# Patient Record
Sex: MALE | Race: WHITE | NOT HISPANIC OR LATINO | Employment: OTHER | URBAN - METROPOLITAN AREA
[De-identification: names, ages, dates, MRNs, and addresses within clinical notes are randomized per-mention and may not be internally consistent; named-entity substitution may affect disease eponyms.]

---

## 2023-02-27 LAB
ANION GAP IN SER/PLAS: 15 MMOL/L (ref 10–20)
CALCIUM (MG/DL) IN SER/PLAS: 10.7 MG/DL (ref 8.6–10.6)
CARBON DIOXIDE, TOTAL (MMOL/L) IN SER/PLAS: 27 MMOL/L (ref 21–32)
CHLORIDE (MMOL/L) IN SER/PLAS: 101 MMOL/L (ref 98–107)
CREATININE (MG/DL) IN SER/PLAS: 1.03 MG/DL (ref 0.5–1.3)
ESTIMATED AVERAGE GLUCOSE FOR HBA1C: 171 MG/DL
GFR MALE: 77 ML/MIN/1.73M2
GLUCOSE (MG/DL) IN SER/PLAS: 125 MG/DL (ref 74–99)
HEMOGLOBIN A1C/HEMOGLOBIN TOTAL IN BLOOD: 7.6 %
POTASSIUM (MMOL/L) IN SER/PLAS: 4.7 MMOL/L (ref 3.5–5.3)
SODIUM (MMOL/L) IN SER/PLAS: 138 MMOL/L (ref 136–145)
UREA NITROGEN (MG/DL) IN SER/PLAS: 27 MG/DL (ref 6–23)

## 2023-04-20 ENCOUNTER — HOSPITAL ENCOUNTER (OUTPATIENT)
Dept: DATA CONVERSION | Facility: HOSPITAL | Age: 73
End: 2023-04-20
Attending: INTERNAL MEDICINE | Admitting: INTERNAL MEDICINE
Payer: COMMERCIAL

## 2023-04-20 DIAGNOSIS — E11.9 TYPE 2 DIABETES MELLITUS WITHOUT COMPLICATIONS (MULTI): ICD-10-CM

## 2023-04-20 DIAGNOSIS — K57.30 DIVERTICULOSIS OF LARGE INTESTINE WITHOUT PERFORATION OR ABSCESS WITHOUT BLEEDING: ICD-10-CM

## 2023-04-20 DIAGNOSIS — Z86.010 PERSONAL HISTORY OF COLONIC POLYPS: ICD-10-CM

## 2023-04-20 DIAGNOSIS — E78.5 HYPERLIPIDEMIA, UNSPECIFIED: ICD-10-CM

## 2023-04-20 DIAGNOSIS — M15.9 POLYOSTEOARTHRITIS, UNSPECIFIED: ICD-10-CM

## 2023-04-20 DIAGNOSIS — I10 ESSENTIAL (PRIMARY) HYPERTENSION: ICD-10-CM

## 2023-04-20 DIAGNOSIS — Z87.891 PERSONAL HISTORY OF NICOTINE DEPENDENCE: ICD-10-CM

## 2023-04-20 DIAGNOSIS — Z79.02 LONG TERM (CURRENT) USE OF ANTITHROMBOTICS/ANTIPLATELETS: ICD-10-CM

## 2023-04-20 DIAGNOSIS — E66.9 OBESITY, UNSPECIFIED: ICD-10-CM

## 2023-04-20 DIAGNOSIS — Z12.11 ENCOUNTER FOR SCREENING FOR MALIGNANT NEOPLASM OF COLON: ICD-10-CM

## 2023-04-20 LAB — POCT GLUCOSE: 156 MG/DL (ref 74–99)

## 2023-06-28 LAB
ANION GAP IN SER/PLAS: 13 MMOL/L (ref 10–20)
CALCIUM (MG/DL) IN SER/PLAS: 10.8 MG/DL (ref 8.6–10.6)
CARBON DIOXIDE, TOTAL (MMOL/L) IN SER/PLAS: 27 MMOL/L (ref 21–32)
CHLORIDE (MMOL/L) IN SER/PLAS: 104 MMOL/L (ref 98–107)
CREATININE (MG/DL) IN SER/PLAS: 0.95 MG/DL (ref 0.5–1.3)
ESTIMATED AVERAGE GLUCOSE FOR HBA1C: 200 MG/DL
GFR MALE: 85 ML/MIN/1.73M2
GLUCOSE (MG/DL) IN SER/PLAS: 151 MG/DL (ref 74–99)
HEMOGLOBIN A1C/HEMOGLOBIN TOTAL IN BLOOD: 8.6 %
POTASSIUM (MMOL/L) IN SER/PLAS: 4.5 MMOL/L (ref 3.5–5.3)
SODIUM (MMOL/L) IN SER/PLAS: 139 MMOL/L (ref 136–145)
UREA NITROGEN (MG/DL) IN SER/PLAS: 22 MG/DL (ref 6–23)

## 2023-09-28 LAB
ANION GAP IN SER/PLAS: 18 MMOL/L (ref 10–20)
CALCIUM (MG/DL) IN SER/PLAS: 10.8 MG/DL (ref 8.6–10.6)
CARBON DIOXIDE, TOTAL (MMOL/L) IN SER/PLAS: 23 MMOL/L (ref 21–32)
CHLORIDE (MMOL/L) IN SER/PLAS: 103 MMOL/L (ref 98–107)
CREATININE (MG/DL) IN SER/PLAS: 1.06 MG/DL (ref 0.5–1.3)
ESTIMATED AVERAGE GLUCOSE FOR HBA1C: 260 MG/DL
GFR MALE: 74 ML/MIN/1.73M2
GLUCOSE (MG/DL) IN SER/PLAS: 192 MG/DL (ref 74–99)
HEMOGLOBIN A1C/HEMOGLOBIN TOTAL IN BLOOD: 10.7 %
POTASSIUM (MMOL/L) IN SER/PLAS: 4.9 MMOL/L (ref 3.5–5.3)
SODIUM (MMOL/L) IN SER/PLAS: 139 MMOL/L (ref 136–145)
UREA NITROGEN (MG/DL) IN SER/PLAS: 22 MG/DL (ref 6–23)

## 2023-10-26 DIAGNOSIS — I10 PRIMARY HYPERTENSION: Primary | ICD-10-CM

## 2023-10-27 RX ORDER — BENAZEPRIL HYDROCHLORIDE 20 MG/1
20 TABLET ORAL DAILY
Qty: 90 TABLET | Refills: 3 | Status: SHIPPED | OUTPATIENT
Start: 2023-10-27 | End: 2023-11-27 | Stop reason: SDUPTHER

## 2023-11-06 DIAGNOSIS — I10 PRIMARY HYPERTENSION: Primary | ICD-10-CM

## 2023-11-07 RX ORDER — METOPROLOL SUCCINATE 50 MG/1
50 TABLET, EXTENDED RELEASE ORAL DAILY
Qty: 90 TABLET | Refills: 3 | Status: SHIPPED | OUTPATIENT
Start: 2023-11-07 | End: 2023-11-27 | Stop reason: SDUPTHER

## 2023-11-08 DIAGNOSIS — I10 ESSENTIAL (PRIMARY) HYPERTENSION: ICD-10-CM

## 2023-11-09 RX ORDER — AMLODIPINE BESYLATE 10 MG/1
10 TABLET ORAL DAILY
Qty: 90 TABLET | Refills: 3 | Status: SHIPPED | OUTPATIENT
Start: 2023-11-09 | End: 2023-11-27 | Stop reason: SDUPTHER

## 2023-11-27 DIAGNOSIS — I10 ESSENTIAL (PRIMARY) HYPERTENSION: ICD-10-CM

## 2023-11-27 DIAGNOSIS — E11.69 TYPE 2 DIABETES MELLITUS WITH OTHER SPECIFIED COMPLICATION, WITH LONG-TERM CURRENT USE OF INSULIN (MULTI): ICD-10-CM

## 2023-11-27 DIAGNOSIS — E11.69 HYPERLIPIDEMIA ASSOCIATED WITH TYPE 2 DIABETES MELLITUS (MULTI): ICD-10-CM

## 2023-11-27 DIAGNOSIS — E78.5 HYPERLIPIDEMIA ASSOCIATED WITH TYPE 2 DIABETES MELLITUS (MULTI): ICD-10-CM

## 2023-11-27 DIAGNOSIS — Z79.4 TYPE 2 DIABETES MELLITUS WITH OTHER SPECIFIED COMPLICATION, WITH LONG-TERM CURRENT USE OF INSULIN (MULTI): ICD-10-CM

## 2023-11-27 DIAGNOSIS — I10 PRIMARY HYPERTENSION: ICD-10-CM

## 2023-11-27 RX ORDER — CLOPIDOGREL BISULFATE 75 MG/1
75 TABLET ORAL DAILY
COMMUNITY
End: 2023-11-27 | Stop reason: SDUPTHER

## 2023-11-27 RX ORDER — GLIPIZIDE 10 MG/1
10 TABLET, FILM COATED, EXTENDED RELEASE ORAL
COMMUNITY
Start: 2023-09-27 | End: 2023-11-27 | Stop reason: SDUPTHER

## 2023-11-27 RX ORDER — GLIPIZIDE 5 MG/1
5 TABLET, FILM COATED, EXTENDED RELEASE ORAL
COMMUNITY

## 2023-11-27 RX ORDER — ATORVASTATIN CALCIUM 40 MG/1
40 TABLET, FILM COATED ORAL NIGHTLY
COMMUNITY
End: 2023-11-27 | Stop reason: SDUPTHER

## 2023-11-27 RX ORDER — BENAZEPRIL HYDROCHLORIDE 20 MG/1
1 TABLET ORAL DAILY
COMMUNITY
Start: 2019-07-03 | End: 2024-01-04 | Stop reason: SDUPTHER

## 2023-11-27 RX ORDER — METFORMIN HYDROCHLORIDE 500 MG/1
1000 TABLET ORAL 2 TIMES DAILY
COMMUNITY
End: 2023-11-27 | Stop reason: SDUPTHER

## 2023-11-29 RX ORDER — GLIPIZIDE 10 MG/1
10 TABLET, FILM COATED, EXTENDED RELEASE ORAL
Qty: 90 TABLET | Refills: 0 | Status: SHIPPED | OUTPATIENT
Start: 2023-11-29

## 2023-11-29 RX ORDER — CLOPIDOGREL BISULFATE 75 MG/1
75 TABLET ORAL DAILY
Qty: 90 TABLET | Refills: 0 | Status: SHIPPED | OUTPATIENT
Start: 2023-11-29 | End: 2024-03-12

## 2023-11-29 RX ORDER — AMLODIPINE BESYLATE 10 MG/1
10 TABLET ORAL DAILY
Qty: 90 TABLET | Refills: 3 | Status: SHIPPED | OUTPATIENT
Start: 2023-11-29

## 2023-11-29 RX ORDER — BENAZEPRIL HYDROCHLORIDE 20 MG/1
20 TABLET ORAL DAILY
Qty: 90 TABLET | Refills: 3 | Status: SHIPPED | OUTPATIENT
Start: 2023-11-29

## 2023-11-29 RX ORDER — ATORVASTATIN CALCIUM 40 MG/1
40 TABLET, FILM COATED ORAL NIGHTLY
Qty: 90 TABLET | Refills: 0 | Status: SHIPPED | OUTPATIENT
Start: 2023-11-29

## 2023-11-29 RX ORDER — METFORMIN HYDROCHLORIDE 500 MG/1
1000 TABLET ORAL 2 TIMES DAILY
Qty: 90 TABLET | Refills: 0 | Status: SHIPPED | OUTPATIENT
Start: 2023-11-29 | End: 2024-01-02 | Stop reason: SDUPTHER

## 2023-11-29 RX ORDER — METOPROLOL SUCCINATE 50 MG/1
50 TABLET, EXTENDED RELEASE ORAL DAILY
Qty: 90 TABLET | Refills: 3 | Status: SHIPPED | OUTPATIENT
Start: 2023-11-29

## 2023-12-18 ENCOUNTER — OFFICE VISIT (OUTPATIENT)
Dept: PODIATRY | Facility: CLINIC | Age: 73
End: 2023-12-18
Payer: COMMERCIAL

## 2023-12-18 DIAGNOSIS — E11.9 ENCOUNTER FOR DIABETIC FOOT EXAM (MULTI): Primary | ICD-10-CM

## 2023-12-18 DIAGNOSIS — B35.1 ONYCHOMYCOSIS: ICD-10-CM

## 2023-12-18 DIAGNOSIS — I73.9 PVD (PERIPHERAL VASCULAR DISEASE) (CMS-HCC): ICD-10-CM

## 2023-12-18 DIAGNOSIS — R73.9 ELEVATED BLOOD SUGAR LEVEL: ICD-10-CM

## 2023-12-18 PROCEDURE — 1160F RVW MEDS BY RX/DR IN RCRD: CPT | Performed by: PODIATRIST

## 2023-12-18 PROCEDURE — 99213 OFFICE O/P EST LOW 20 MIN: CPT | Performed by: PODIATRIST

## 2023-12-18 PROCEDURE — 1159F MED LIST DOCD IN RCRD: CPT | Performed by: PODIATRIST

## 2023-12-18 PROCEDURE — 1126F AMNT PAIN NOTED NONE PRSNT: CPT | Performed by: PODIATRIST

## 2023-12-18 NOTE — PROGRESS NOTES
History of Present Illness:   Patient states they are here for Dm exam  Denies NTB to feet  Most recent A1C is 10.7  Unable to safely trim nails on own  Here for skin check    Past Medical History  Past Medical History:   Diagnosis Date    Tinea pedis 08/21/2020    Tinea pedis of both feet       Medications and Allergies have been reviewed.    Review Of Systems:  GENERAL: No weight loss, malaise or fevers.  HEENT: Negative for frequent or significant headaches,   RESPIRATORY: Negative for cough, wheezing or shortness of breath.  CARDIOVASCULAR: Negative for chest pain, leg swelling or palpitations.    Examination of Both Lower Extremities:   Vascular  DP and PT pulses are palpable 2/4 bilaterally. CFT is 3 seconds to hallux bilaterally. Skin temperature is warm to cool from proximal to distal bilaterally. No edema noted. ++varicosities noted. No Hair growth is noted to the digits bilaterally.     Neurology  Vibratory sensation is diminished to MPJ bilaterally. Protective sensation is intact at 8/10 sites, using a [place] 5.07 monofilament bilaterally. Proprioception is intact at the 1st MPJ bilaterally.     Dermatology  Nails 1-5 bilaterally are thick, long and dark with subungual debri. Webspaces 1-4 bilaterally are clean, dry, and intact without any debris or maceration noted. No hyperkeratotic tissue noted. No subcutaneous nodules or rashes noted. No open lesions noted. Skin appears dry to b/l LE. Left hallux avulsed in total     Musculoskeletal  Muscle strength is 5/5 for plantarflexors, dorsiflexors, everters, and inverters bilaterally. Contracted digits 2-4 b/l at IPJ level. Adducto varus deformity noted to 5th toe b/l.     1. Encounter for diabetic foot exam (CMS/[facility])        2. Elevated blood sugar level        3. PVD (peripheral vascular disease) (CMS/[facility])        4. Onychomycosis            Patient educated on proper diabetic foot care.  Nails 1-5 b/l were debrided in thickness and length with  nail cutting forceps.  A1C revd. Over 10. Need to work on decreasing sugar. Increased sugar can lead to further issues with feet down the linePatient to follow up in 6 mos or sooner if any problems arise.   Patient was in agreement to this plan. All questions answered.      Jayde Dennis DPM  [phone]  Option 2  Fax: [phone]

## 2023-12-27 PROBLEM — E55.9 VITAMIN D DEFICIENCY: Status: ACTIVE | Noted: 2023-12-27

## 2023-12-27 PROBLEM — H25.13 AGE-RELATED NUCLEAR CATARACT OF BOTH EYES: Status: ACTIVE | Noted: 2023-12-27

## 2023-12-27 PROBLEM — L02.91 ABSCESS OF SKIN: Status: ACTIVE | Noted: 2023-12-27

## 2023-12-27 PROBLEM — M79.675 PAIN IN TOES OF BOTH FEET: Status: ACTIVE | Noted: 2023-12-27

## 2023-12-27 PROBLEM — B35.1 ONYCHOMYCOSIS: Status: ACTIVE | Noted: 2023-12-27

## 2023-12-27 PROBLEM — B35.4 TINEA CORPORIS: Status: ACTIVE | Noted: 2023-12-27

## 2023-12-27 PROBLEM — E78.5 HYPERLIPIDEMIA: Status: ACTIVE | Noted: 2023-12-27

## 2023-12-27 PROBLEM — M20.41 HAMMER TOES, BILATERAL: Status: ACTIVE | Noted: 2023-12-27

## 2023-12-27 PROBLEM — H91.90 HEARING DECREASED: Status: ACTIVE | Noted: 2023-12-27

## 2023-12-27 PROBLEM — M79.643 HAND PAIN: Status: ACTIVE | Noted: 2023-12-27

## 2023-12-27 PROBLEM — M79.674 PAIN IN TOES OF BOTH FEET: Status: ACTIVE | Noted: 2023-12-27

## 2023-12-27 PROBLEM — E11.65 CONTROLLED DIABETES MELLITUS WITH HYPERGLYCEMIA, WITHOUT LONG-TERM CURRENT USE OF INSULIN (MULTI): Status: ACTIVE | Noted: 2023-12-27

## 2023-12-27 PROBLEM — L82.0 INFLAMED SEBORRHEIC KERATOSIS: Status: ACTIVE | Noted: 2021-12-07

## 2023-12-27 PROBLEM — S91.209A TRAUMATIC AVULSION OF NAIL PLATE OF TOE: Status: ACTIVE | Noted: 2023-12-27

## 2023-12-27 PROBLEM — H91.93 BILATERAL HEARING LOSS: Status: ACTIVE | Noted: 2023-12-27

## 2023-12-27 PROBLEM — E78.5 DYSLIPIDEMIA: Status: ACTIVE | Noted: 2023-12-27

## 2023-12-27 PROBLEM — M20.42 HAMMER TOES, BILATERAL: Status: ACTIVE | Noted: 2023-12-27

## 2023-12-27 PROBLEM — E11.9 DIABETES MELLITUS (MULTI): Status: ACTIVE | Noted: 2023-12-27

## 2023-12-27 PROBLEM — L57.0 ACTINIC KERATOSIS: Status: ACTIVE | Noted: 2021-12-07

## 2023-12-27 PROBLEM — H54.7 DECREASED VISUAL ACUITY: Status: ACTIVE | Noted: 2023-12-27

## 2023-12-27 PROBLEM — R21 RASH AND NONSPECIFIC SKIN ERUPTION: Status: ACTIVE | Noted: 2023-12-27

## 2023-12-27 RX ORDER — BETAMETHASONE DIPROPIONATE 0.5 MG/G
1 CREAM TOPICAL 2 TIMES DAILY
COMMUNITY
Start: 2019-07-19

## 2023-12-27 RX ORDER — CARVEDILOL 12.5 MG/1
1 TABLET ORAL 2 TIMES DAILY
COMMUNITY
Start: 2021-03-03 | End: 2024-01-04 | Stop reason: WASHOUT

## 2023-12-27 RX ORDER — OFLOXACIN 3 MG/ML
SOLUTION/ DROPS OPHTHALMIC
COMMUNITY
Start: 2020-07-15

## 2023-12-27 RX ORDER — AMLODIPINE BESYLATE 10 MG/1
1 TABLET ORAL DAILY
COMMUNITY
Start: 2018-03-23

## 2023-12-27 RX ORDER — ASPIRIN 325 MG
TABLET ORAL
COMMUNITY

## 2023-12-27 RX ORDER — BLOOD SUGAR DIAGNOSTIC
STRIP MISCELLANEOUS
COMMUNITY

## 2023-12-27 RX ORDER — DULAGLUTIDE 1.5 MG/.5ML
INJECTION, SOLUTION SUBCUTANEOUS
COMMUNITY
End: 2024-05-30

## 2023-12-27 RX ORDER — LATANOPROST 50 UG/ML
SOLUTION/ DROPS OPHTHALMIC
COMMUNITY

## 2023-12-27 RX ORDER — TIMOLOL MALEATE 5 MG/ML
SOLUTION/ DROPS OPHTHALMIC
COMMUNITY
Start: 2023-10-11

## 2023-12-27 RX ORDER — GLIMEPIRIDE 4 MG/1
1 TABLET ORAL
COMMUNITY
Start: 2017-03-07 | End: 2024-01-04 | Stop reason: WASHOUT

## 2023-12-27 RX ORDER — DICLOFENAC SODIUM 1 MG/ML
SOLUTION/ DROPS OPHTHALMIC
COMMUNITY
Start: 2023-01-12

## 2023-12-27 RX ORDER — AMMONIUM LACTATE 12 G/100G
CREAM TOPICAL
COMMUNITY
Start: 2023-04-03 | End: 2024-01-04 | Stop reason: WASHOUT

## 2023-12-27 RX ORDER — BLOOD-GLUCOSE CONTROL, NORMAL
EACH MISCELLANEOUS
COMMUNITY
Start: 2022-05-19

## 2023-12-27 RX ORDER — PREDNISOLONE ACETATE 10 MG/ML
SUSPENSION/ DROPS OPHTHALMIC
COMMUNITY
Start: 2020-07-15

## 2023-12-27 RX ORDER — SENNOSIDES/DOCUSATE SODIUM 8.6MG-50MG
TABLET ORAL 2 TIMES DAILY
COMMUNITY
Start: 2022-05-19

## 2023-12-27 RX ORDER — ASPIRIN 325 MG
325 TABLET, DELAYED RELEASE (ENTERIC COATED) ORAL
COMMUNITY

## 2023-12-27 RX ORDER — SITAGLIPTIN 100 MG/1
100 TABLET, FILM COATED ORAL DAILY
COMMUNITY
Start: 2023-08-23 | End: 2024-05-07 | Stop reason: WASHOUT

## 2023-12-27 RX ORDER — BLOOD SUGAR DIAGNOSTIC
STRIP MISCELLANEOUS
COMMUNITY
Start: 2020-11-28

## 2023-12-27 RX ORDER — POLYETHYLENE GLYCOL 3350, SODIUM CHLORIDE, SODIUM BICARBONATE, POTASSIUM CHLORIDE 420; 11.2; 5.72; 1.48 G/4L; G/4L; G/4L; G/4L
4000 POWDER, FOR SOLUTION ORAL ONCE AS NEEDED
COMMUNITY
Start: 2023-03-06

## 2023-12-27 RX ORDER — CARVEDILOL 12.5 MG/1
12.5 TABLET ORAL
COMMUNITY
End: 2024-01-04 | Stop reason: WASHOUT

## 2023-12-27 RX ORDER — DAPAGLIFLOZIN 10 MG/1
10 TABLET, FILM COATED ORAL DAILY
COMMUNITY
Start: 2023-11-27 | End: 2024-04-19 | Stop reason: SDUPTHER

## 2023-12-27 RX ORDER — DICLOFENAC SODIUM 10 MG/G
2 GEL TOPICAL 2 TIMES DAILY PRN
COMMUNITY
Start: 2023-09-09

## 2024-01-02 DIAGNOSIS — Z79.4 TYPE 2 DIABETES MELLITUS WITH OTHER SPECIFIED COMPLICATION, WITH LONG-TERM CURRENT USE OF INSULIN (MULTI): ICD-10-CM

## 2024-01-02 DIAGNOSIS — E11.69 TYPE 2 DIABETES MELLITUS WITH OTHER SPECIFIED COMPLICATION, WITH LONG-TERM CURRENT USE OF INSULIN (MULTI): ICD-10-CM

## 2024-01-02 RX ORDER — METFORMIN HYDROCHLORIDE 500 MG/1
1000 TABLET ORAL 2 TIMES DAILY
Qty: 90 TABLET | Refills: 0 | Status: SHIPPED | OUTPATIENT
Start: 2024-01-02 | End: 2024-01-04 | Stop reason: SDUPTHER

## 2024-01-04 ENCOUNTER — OFFICE VISIT (OUTPATIENT)
Dept: PRIMARY CARE | Facility: CLINIC | Age: 74
End: 2024-01-04
Payer: COMMERCIAL

## 2024-01-04 VITALS
DIASTOLIC BLOOD PRESSURE: 81 MMHG | HEIGHT: 67 IN | OXYGEN SATURATION: 96 % | HEART RATE: 74 BPM | WEIGHT: 207 LBS | TEMPERATURE: 95.6 F | RESPIRATION RATE: 14 BRPM | SYSTOLIC BLOOD PRESSURE: 122 MMHG | BODY MASS INDEX: 32.49 KG/M2

## 2024-01-04 DIAGNOSIS — E11.69 TYPE 2 DIABETES MELLITUS WITH OTHER SPECIFIED COMPLICATION, WITH LONG-TERM CURRENT USE OF INSULIN (MULTI): ICD-10-CM

## 2024-01-04 DIAGNOSIS — Z79.4 TYPE 2 DIABETES MELLITUS WITH OTHER SPECIFIED COMPLICATION, WITH LONG-TERM CURRENT USE OF INSULIN (MULTI): ICD-10-CM

## 2024-01-04 DIAGNOSIS — I10 BENIGN ESSENTIAL HYPERTENSION: Primary | ICD-10-CM

## 2024-01-04 LAB
ANION GAP SERPL CALC-SCNC: 15 MMOL/L (ref 10–20)
BUN SERPL-MCNC: 23 MG/DL (ref 6–23)
CALCIUM SERPL-MCNC: 10.9 MG/DL (ref 8.6–10.6)
CHLORIDE SERPL-SCNC: 102 MMOL/L (ref 98–107)
CO2 SERPL-SCNC: 25 MMOL/L (ref 21–32)
CREAT SERPL-MCNC: 1 MG/DL (ref 0.5–1.3)
EST. AVERAGE GLUCOSE BLD GHB EST-MCNC: 200 MG/DL
GFR SERPL CREATININE-BSD FRML MDRD: 79 ML/MIN/1.73M*2
GLUCOSE SERPL-MCNC: 183 MG/DL (ref 74–99)
HBA1C MFR BLD: 8.6 %
POTASSIUM SERPL-SCNC: 4.6 MMOL/L (ref 3.5–5.3)
SODIUM SERPL-SCNC: 137 MMOL/L (ref 136–145)

## 2024-01-04 PROCEDURE — 99213 OFFICE O/P EST LOW 20 MIN: CPT | Performed by: NURSE PRACTITIONER

## 2024-01-04 PROCEDURE — 1036F TOBACCO NON-USER: CPT | Performed by: NURSE PRACTITIONER

## 2024-01-04 PROCEDURE — 3074F SYST BP LT 130 MM HG: CPT | Performed by: NURSE PRACTITIONER

## 2024-01-04 PROCEDURE — 1126F AMNT PAIN NOTED NONE PRSNT: CPT | Performed by: NURSE PRACTITIONER

## 2024-01-04 PROCEDURE — 4010F ACE/ARB THERAPY RXD/TAKEN: CPT | Performed by: NURSE PRACTITIONER

## 2024-01-04 PROCEDURE — 3052F HG A1C>EQUAL 8.0%<EQUAL 9.0%: CPT | Performed by: NURSE PRACTITIONER

## 2024-01-04 PROCEDURE — 83036 HEMOGLOBIN GLYCOSYLATED A1C: CPT | Performed by: NURSE PRACTITIONER

## 2024-01-04 PROCEDURE — 82374 ASSAY BLOOD CARBON DIOXIDE: CPT | Performed by: NURSE PRACTITIONER

## 2024-01-04 PROCEDURE — 3079F DIAST BP 80-89 MM HG: CPT | Performed by: NURSE PRACTITIONER

## 2024-01-04 PROCEDURE — 36415 COLL VENOUS BLD VENIPUNCTURE: CPT | Performed by: NURSE PRACTITIONER

## 2024-01-04 RX ORDER — METFORMIN HYDROCHLORIDE 500 MG/1
1000 TABLET ORAL 2 TIMES DAILY
Qty: 90 TABLET | Refills: 3 | Status: SHIPPED | OUTPATIENT
Start: 2024-01-04 | End: 2024-05-20 | Stop reason: SDUPTHER

## 2024-01-04 RX ORDER — DULAGLUTIDE 1.5 MG/.5ML
1.5 INJECTION, SOLUTION SUBCUTANEOUS
Qty: 2 ML | Refills: 11 | Status: SHIPPED | OUTPATIENT
Start: 2024-01-04 | End: 2024-05-07 | Stop reason: WASHOUT

## 2024-01-04 RX ORDER — BENAZEPRIL HYDROCHLORIDE 20 MG/1
20 TABLET ORAL DAILY
Qty: 90 TABLET | Refills: 3 | Status: SHIPPED | OUTPATIENT
Start: 2024-01-04

## 2024-01-04 ASSESSMENT — PAIN SCALES - GENERAL: PAINLEVEL: 0-NO PAIN

## 2024-01-04 NOTE — PROGRESS NOTES
"Subjective   Shaan Dailey is a 73 y.o. male who presents for Follow-up.  HPI  Shaan is here today for follow up. Notes that he is generally doing well. Has been working to optimize his weight, trying to watch what he eats, but does admit to \"cheating\" from time to time. Denies polydipsia, polyuria, headache, blurred vision, or lightheadedness  He has had challenge with getting his medications. His insurance was not covering GLP injection, and so he restarted Farxiga, which works, but not as well for blood sugar management. He is hopeful that in the new year he will have an easier time getting the right medications.   All systems reviewed. Review of systems negative except for noted positives in HPI    Objective     /81   Pulse 74   Temp 35.3 °C (95.6 °F)   Resp 14   Ht 1.702 m (5' 7\")   Wt 93.9 kg (207 lb)   SpO2 96%   BMI 32.42 kg/m²    Vital signs noted and reviewed.       Physical Exam  Constitutional:       Appearance: Normal appearance.   Cardiovascular:      Rate and Rhythm: Normal rate and regular rhythm.   Pulmonary:      Effort: Pulmonary effort is normal. No respiratory distress.      Breath sounds: Normal breath sounds.   Skin:     General: Skin is warm and dry.   Neurological:      Mental Status: He is oriented to person, place, and time.   Psychiatric:         Mood and Affect: Mood normal.             Assessment/Plan   Problem List Items Addressed This Visit       Benign essential hypertension - Primary    Relevant Medications    benazepril (Lotensin) 20 mg tablet    Diabetes mellitus (CMS/HCC)    Relevant Medications    metFORMIN (Glucophage) 500 mg tablet    dulaglutide (Trulicity) 1.5 mg/0.5 mL pen injector injection    Other Relevant Orders    Hemoglobin A1C (Completed)    Basic Metabolic Panel (Completed)               "

## 2024-01-04 NOTE — PATIENT INSTRUCTIONS
Thank you for coming in for your visit today!    Please follow up in 4 months or sooner if needed.    Our goal is to transition you to the once weekly injection dependent upon insurance coverage     Continue to self monitor your blood sugar and take your medications, as directed.   Take the Farxiga until we can get the injection.   Decrease refined sugars and carbohydrates in your diet.   DO NOT SKIP MEALS! It is important for your blood sugar to have small healthy meals throughout the day.  Make sure to keep a snack on your person at all times, if needed, for low blood sugar.  Exercise for 30 minutes daily.    Drink adequate liquids before, during, and after exercise to prevent dehydration, which can alter blood sugar levels.    For your blood pressure:  Take your medication as directed. Try to take it around the same time daily.   Keep a log of your blood pressure. Be sure to bring it with you to your next appointment so we can review it together.  Adhere to the DASH diet. This includes decreasing your salt/sodium intake. Avoid canned foods, lunch meats, and frozen foods.  Exercise for 30 minutes daily.    Today we completed blood work. We will contact you with any abnormalities from this testing.      Call 911 or go to the emergency room if you have pain in your chest, difficulty breathing, or other life threatening symptoms.

## 2024-02-26 ENCOUNTER — APPOINTMENT (OUTPATIENT)
Dept: PODIATRY | Facility: CLINIC | Age: 74
End: 2024-02-26
Payer: COMMERCIAL

## 2024-03-12 DIAGNOSIS — I10 ESSENTIAL (PRIMARY) HYPERTENSION: ICD-10-CM

## 2024-03-12 RX ORDER — CLOPIDOGREL BISULFATE 75 MG/1
75 TABLET ORAL DAILY
Qty: 90 TABLET | Refills: 0 | Status: SHIPPED | OUTPATIENT
Start: 2024-03-12

## 2024-04-19 ENCOUNTER — DOCUMENTATION (OUTPATIENT)
Dept: PRIMARY CARE | Facility: CLINIC | Age: 74
End: 2024-04-19
Payer: COMMERCIAL

## 2024-04-19 DIAGNOSIS — Z79.4 TYPE 2 DIABETES MELLITUS WITH OTHER SPECIFIED COMPLICATION, WITH LONG-TERM CURRENT USE OF INSULIN (MULTI): ICD-10-CM

## 2024-04-19 DIAGNOSIS — E11.69 TYPE 2 DIABETES MELLITUS WITH OTHER SPECIFIED COMPLICATION, WITH LONG-TERM CURRENT USE OF INSULIN (MULTI): ICD-10-CM

## 2024-04-22 RX ORDER — DAPAGLIFLOZIN 10 MG/1
10 TABLET, FILM COATED ORAL DAILY
Qty: 90 TABLET | Refills: 1 | Status: SHIPPED | OUTPATIENT
Start: 2024-04-22 | End: 2024-04-24 | Stop reason: SDUPTHER

## 2024-04-24 ENCOUNTER — CLINICAL SUPPORT (OUTPATIENT)
Dept: PRIMARY CARE | Facility: CLINIC | Age: 74
End: 2024-04-24
Payer: COMMERCIAL

## 2024-04-24 VITALS — WEIGHT: 221.4 LBS | BODY MASS INDEX: 34.68 KG/M2

## 2024-04-24 DIAGNOSIS — Z79.4 TYPE 2 DIABETES MELLITUS WITH OTHER SPECIFIED COMPLICATION, WITH LONG-TERM CURRENT USE OF INSULIN (MULTI): ICD-10-CM

## 2024-04-24 DIAGNOSIS — E11.69 TYPE 2 DIABETES MELLITUS WITH OTHER SPECIFIED COMPLICATION, WITH LONG-TERM CURRENT USE OF INSULIN (MULTI): ICD-10-CM

## 2024-04-24 DIAGNOSIS — E55.9 VITAMIN D DEFICIENCY: ICD-10-CM

## 2024-04-24 DIAGNOSIS — E10.69 TYPE 1 DIABETES MELLITUS WITH OTHER SPECIFIED COMPLICATION (MULTI): ICD-10-CM

## 2024-04-24 LAB
25(OH)D3 SERPL-MCNC: 25 NG/ML (ref 30–100)
ANION GAP SERPL CALC-SCNC: 13 MMOL/L (ref 10–20)
BUN SERPL-MCNC: 18 MG/DL (ref 6–23)
CALCIUM SERPL-MCNC: 11.2 MG/DL (ref 8.6–10.6)
CHLORIDE SERPL-SCNC: 99 MMOL/L (ref 98–107)
CO2 SERPL-SCNC: 31 MMOL/L (ref 21–32)
CREAT SERPL-MCNC: 0.9 MG/DL (ref 0.5–1.3)
EGFRCR SERPLBLD CKD-EPI 2021: 90 ML/MIN/1.73M*2
EST. AVERAGE GLUCOSE BLD GHB EST-MCNC: 220 MG/DL
GLUCOSE SERPL-MCNC: 266 MG/DL (ref 74–99)
HBA1C MFR BLD: 9.3 %
POTASSIUM SERPL-SCNC: 5 MMOL/L (ref 3.5–5.3)
SODIUM SERPL-SCNC: 138 MMOL/L (ref 136–145)

## 2024-04-24 PROCEDURE — 80048 BASIC METABOLIC PNL TOTAL CA: CPT

## 2024-04-24 PROCEDURE — 36415 COLL VENOUS BLD VENIPUNCTURE: CPT

## 2024-04-24 PROCEDURE — 83036 HEMOGLOBIN GLYCOSYLATED A1C: CPT

## 2024-04-24 PROCEDURE — 82306 VITAMIN D 25 HYDROXY: CPT

## 2024-04-24 RX ORDER — DAPAGLIFLOZIN 10 MG/1
10 TABLET, FILM COATED ORAL DAILY
Qty: 90 TABLET | Refills: 1 | Status: SHIPPED | OUTPATIENT
Start: 2024-04-24 | End: 2024-05-30 | Stop reason: SDUPTHER

## 2024-05-02 DIAGNOSIS — E11.9 TYPE 2 DIABETES MELLITUS WITHOUT COMPLICATION, WITHOUT LONG-TERM CURRENT USE OF INSULIN (MULTI): Primary | ICD-10-CM

## 2024-05-02 DIAGNOSIS — E55.9 VITAMIN D DEFICIENCY: ICD-10-CM

## 2024-05-02 RX ORDER — ERGOCALCIFEROL 1.25 MG/1
50000 CAPSULE ORAL
Qty: 8 CAPSULE | Refills: 0 | Status: SHIPPED | OUTPATIENT
Start: 2024-05-05

## 2024-05-06 ENCOUNTER — OFFICE VISIT (OUTPATIENT)
Dept: PRIMARY CARE | Facility: CLINIC | Age: 74
End: 2024-05-06
Payer: COMMERCIAL

## 2024-05-06 VITALS
DIASTOLIC BLOOD PRESSURE: 72 MMHG | BODY MASS INDEX: 34.55 KG/M2 | TEMPERATURE: 96.7 F | RESPIRATION RATE: 16 BRPM | HEART RATE: 83 BPM | OXYGEN SATURATION: 96 % | WEIGHT: 215 LBS | SYSTOLIC BLOOD PRESSURE: 115 MMHG | HEIGHT: 66 IN

## 2024-05-06 DIAGNOSIS — I10 BENIGN ESSENTIAL HYPERTENSION: ICD-10-CM

## 2024-05-06 DIAGNOSIS — I48.20 CHRONIC ATRIAL FIBRILLATION (MULTI): Primary | ICD-10-CM

## 2024-05-06 DIAGNOSIS — E11.9 TYPE 2 DIABETES MELLITUS WITHOUT COMPLICATION, WITHOUT LONG-TERM CURRENT USE OF INSULIN (MULTI): ICD-10-CM

## 2024-05-06 PROCEDURE — 3078F DIAST BP <80 MM HG: CPT | Performed by: NURSE PRACTITIONER

## 2024-05-06 PROCEDURE — 3074F SYST BP LT 130 MM HG: CPT | Performed by: NURSE PRACTITIONER

## 2024-05-06 PROCEDURE — 3046F HEMOGLOBIN A1C LEVEL >9.0%: CPT | Performed by: NURSE PRACTITIONER

## 2024-05-06 PROCEDURE — 1126F AMNT PAIN NOTED NONE PRSNT: CPT | Performed by: NURSE PRACTITIONER

## 2024-05-06 PROCEDURE — 99214 OFFICE O/P EST MOD 30 MIN: CPT | Performed by: NURSE PRACTITIONER

## 2024-05-06 PROCEDURE — 4010F ACE/ARB THERAPY RXD/TAKEN: CPT | Performed by: NURSE PRACTITIONER

## 2024-05-06 ASSESSMENT — ENCOUNTER SYMPTOMS
LOSS OF SENSATION IN FEET: 0
DEPRESSION: 0
OCCASIONAL FEELINGS OF UNSTEADINESS: 0

## 2024-05-06 ASSESSMENT — PATIENT HEALTH QUESTIONNAIRE - PHQ9
SUM OF ALL RESPONSES TO PHQ9 QUESTIONS 1 AND 2: 0
1. LITTLE INTEREST OR PLEASURE IN DOING THINGS: NOT AT ALL
2. FEELING DOWN, DEPRESSED OR HOPELESS: NOT AT ALL

## 2024-05-06 ASSESSMENT — PAIN SCALES - GENERAL: PAINLEVEL: 0-NO PAIN

## 2024-05-06 NOTE — PROGRESS NOTES
"Subjective   Shaan Dailey is a 73 y.o. male who presents for Follow-up.  HPI  Mr. Dailey is here today for follow up. He is open to consultation with clinical pharmacist for diabetic management. Previously, when he was able to obtain GLP agent consistently on insurance he had outstanding diabetic control. He is unfortunately unable to afford medication at this time. Control has continued to worsen. Is taking Farxiga, which is also expensive.   Denies polydipsia, polyuria, headache, blurred vision, or lightheadedness    Patient notes that he was previously walking consistently. He would like to get back into this habit.   He does enjoy eating pasta, tries to do so in moderation. He is open to trying to eat a vegetable with every meal.     Notes some arthritic aches and pains in multiple joints. He does not currently take OTC medication, but does have Tylenol at home and would consider using it if needed. Pain comes and go. May worsen with the weather. Had a knee pain yesterday because he twisted awkwardly. Has a knee brace he uses from time to time for support.     All systems reviewed. Review of systems negative except for noted positives in HPI    Objective     /72   Pulse 83   Temp 35.9 °C (96.7 °F)   Resp 16   Ht 1.676 m (5' 6\")   Wt 97.5 kg (215 lb)   SpO2 96%   BMI 34.70 kg/m²    Vital signs noted and reviewed.       Physical Exam  Constitutional:       Appearance: Normal appearance.   Cardiovascular:      Rate and Rhythm: Normal rate and regular rhythm.   Pulmonary:      Effort: Pulmonary effort is normal. No respiratory distress.      Breath sounds: Normal breath sounds.   Skin:     General: Skin is warm and dry.   Neurological:      Mental Status: He is oriented to person, place, and time.   Psychiatric:         Mood and Affect: Mood normal.             Assessment/Plan   Problem List Items Addressed This Visit       Atrial fibrillation (Multi) - Primary    Relevant Orders    CT cardiac scoring wo " IV contrast    Benign essential hypertension    Relevant Orders    CT cardiac scoring wo IV contrast    Diabetes mellitus (Multi)    Relevant Orders    CT cardiac scoring wo IV contrast

## 2024-05-06 NOTE — PATIENT INSTRUCTIONS
Thank you for coming in for your visit today!    Please follow up in 3.5 months.   Come for blood work a week or 2 before your next appointment (any time after August).     Continue to self monitor your blood sugar and take your medications, as directed.   Decrease refined sugars and carbohydrates in your diet.   DO NOT SKIP MEALS! It is important for your blood sugar to have small healthy meals throughout the day.  Make sure to keep a snack on your person at all times, if needed, for low blood sugar.  Exercise for 30 minutes daily.    Drink adequate liquids before, during, and after exercise to prevent dehydration, which can alter blood sugar levels.    Someone from the pharmacy program will contact Jessica for scheduling.     Call to schedule CT cardiac scoring [phone].     Call me with any questions or concerns.     Call [phone] or go to the emergency room if you have pain in your chest, difficulty breathing, or other life threatening symptoms.

## 2024-05-20 DIAGNOSIS — Z79.4 TYPE 2 DIABETES MELLITUS WITH OTHER SPECIFIED COMPLICATION, WITH LONG-TERM CURRENT USE OF INSULIN (MULTI): ICD-10-CM

## 2024-05-20 DIAGNOSIS — E11.69 TYPE 2 DIABETES MELLITUS WITH OTHER SPECIFIED COMPLICATION, WITH LONG-TERM CURRENT USE OF INSULIN (MULTI): ICD-10-CM

## 2024-05-20 RX ORDER — METFORMIN HYDROCHLORIDE 500 MG/1
1000 TABLET ORAL 2 TIMES DAILY
Qty: 90 TABLET | Refills: 3 | Status: SHIPPED | OUTPATIENT
Start: 2024-05-20 | End: 2024-05-21

## 2024-05-21 RX ORDER — METFORMIN HYDROCHLORIDE 500 MG/1
1000 TABLET ORAL 2 TIMES DAILY
Qty: 368 TABLET | Refills: 2 | Status: SHIPPED | OUTPATIENT
Start: 2024-05-21

## 2024-05-23 ENCOUNTER — TELEMEDICINE (OUTPATIENT)
Dept: PHARMACY | Facility: HOSPITAL | Age: 74
End: 2024-05-23
Payer: COMMERCIAL

## 2024-05-23 DIAGNOSIS — E11.9 TYPE 2 DIABETES MELLITUS WITHOUT COMPLICATION, WITHOUT LONG-TERM CURRENT USE OF INSULIN (MULTI): ICD-10-CM

## 2024-05-23 NOTE — PROGRESS NOTES
Patient ID: Shaan Dailey is a 73 y.o. male who presents for Diabetes.  Patient was referred to pharmacy for cost assistance with GLP1. Spoke with patient's SACHINAJessica.     Referring Provider: Kelly Saha APRN-CNP  PCP: ADILSON Pickett Last visit with PCP: 24 Next visit with PCP: 24      Subjective   Treatment Adherence:   Preferred pharmacy: [facility] patient afford prescribed medications: No, Patient unable to afford Trulicity    Diabetes  He presents for his initial diabetic visit. He has type 2 diabetes mellitus. His disease course has been worsening. Current diabetic treatment includes oral agent (triple therapy). An ACE inhibitor/angiotensin II receptor blocker is being taken.     Current DM Regimen  Metformin 500 m tablets twice daily  Farxiga 10 m tablet daily  Glipizide XL 10 m tablet daily    Past DM Medications  Trulicity: discontinued due to cost  Januvia: discontinued due to cost    DISCUSSION  Patient was previously established on Trulicity 1.5 mg which patient tolerated well and experienced good control of his blood sugars however patient was unable to continue affording medication  Patient has been off of medication for around 1 year now  Patient was started on Farxiga instead which is also expensive  Patient denies any side effects at this time    Patient Assistance Program Pre-Screening: Completed  Patient may qualify based on household income  POA to submit patient's proof of income for the application   IF patient does not qualify for  PAP, can look for  PAP  Objective     There were no vitals taken for this visit.   BP Readings from Last 4 Encounters:   24 115/72   24 122/81   23 142/72   23 138/80      There were no vitals filed for this visit.     Labs  Lab Results   Component Value Date    BILITOT 0.9 2018    CALCIUM 11.2 (H) 2024    CO2 31 2024    CL 99 2024    CREATININE  0.90 04/24/2024    GLUCOSE 266 (H) 04/24/2024    ALKPHOS 44 08/22/2018    K 5.0 04/24/2024    PROT 6.3 (L) 08/22/2018     04/24/2024    AST 11 08/22/2018    ALT 21 08/22/2018    BUN 18 04/24/2024    ANIONGAP 13 04/24/2024    ALBUMIN 4.5 08/22/2018    GFRMALE 74 09/27/2023     Lab Results   Component Value Date    TRIG 169 (H) 03/03/2021    CHOL 105 03/03/2021    HDL 26.5 (A) 03/03/2021     Lab Results   Component Value Date    HGBA1C 9.3 (H) 04/24/2024       Current Outpatient Medications   Medication Instructions    amLODIPine (Norvasc) 10 mg tablet 1 tablet, oral, Daily    amLODIPine (NORVASC) 10 mg, oral, Daily, for blood pressure    aspirin 325 mg tablet oral    aspirin 325 mg, oral, Daily RT    atorvastatin (LIPITOR) 40 mg, oral, Nightly    benazepril (LOTENSIN) 20 mg, oral, Daily    benazepril (LOTENSIN) 20 mg, oral, Daily    betamethasone, augmented, (Diprolene AF) 0.05 % cream 1 Application, Topical, 2 times daily    blood sugar diagnostic (Advanced Gluc Meter Test Strip) strip 2 times daily    clopidogrel (PLAVIX) 75 mg, oral, Daily    diclofenac (Voltaren) 0.1 % ophthalmic solution INSTILL 1 DROP INTO RIGHT EYE TWICE A DAY FOR 1 WEEK THEN DISCARD REMAINING    diclofenac sodium (VOLTAREN) 2 g, Topical, 2 times daily PRN, Apply to affected area    ergocalciferol (VITAMIN D-2) 50,000 Units, oral, Once Weekly    Farxiga 10 mg, oral, Daily    glipiZIDE XL (GLUCOTROL XL) 5 mg, oral, Daily with breakfast    glipiZIDE XL (GLUCOTROL XL) 10 mg, oral, Daily with breakfast    lancets (2-In-1 Lancet Device) 30 gauge misc Use twice daily to evaluate blood sugar    latanoprost (Xalatan) 0.005 % ophthalmic solution INSTILL 1 DROP INTO BOTH EYES IN THE EVENING    metFORMIN (GLUCOPHAGE) 1,000 mg, oral, 2 times daily    metoprolol succinate XL (TOPROL-XL) 50 mg, oral, Daily    ofloxacin (Ocuflox) 0.3 % ophthalmic solution ophthalmic (eye)    OneTouch Ultra Test strip TEST BG'S TWICE DAILY. DX CODE: E11.29, E11.65,  NIDDM WITH HX RENAL IMPAIRMENT, HTN, CAD & A-FIB.    OneTouch Ultra Test strip USE 1 STRIP TWICE A DAY    polyethylene glycol-electrolytes 420 gram solution 4,000 mL, oral, Once as needed, Mix and drink 4000 mL as per split dose  Endoscopy Instructions    prednisoLONE acetate (Pred-Forte) 1 % ophthalmic suspension ophthalmic (eye)    terbinafine (LamISIL) 1 % cream Apply topically 2 times a day as needed for itching or irritation.    timolol (Timoptic) 0.5 % ophthalmic solution INSTILL 1 DROP LEFT EYE EVERY MORNING    Trulicity 1.5 mg/0.5 mL pen injector injection INJECT 1.5MG ONCE A WEEK AS DIRECTED       Assessment/Plan   Problem List Items Addressed This Visit             ICD-10-CM    Diabetes mellitus (Multi) E11.9     Diabetes is not controlled with A1c of 9.3% (goal <7% or <7.5% due to age)  CONTINUE metformin 500  mg twice daily, Farxiga 10 mg daily, and glipizide 10 mg daily  Please submit proof of income for  PAP application               Follow-up: 1 week to check on  PAP application status     Time spent with pt: Total length of time 20 (minutes) of the encounter and more than 50% was spent counseling the patient.    Susi Moore, PharmD    Continue all meds under the continuation of care with the referring provider and clinical pharmacy team.

## 2024-05-23 NOTE — ASSESSMENT & PLAN NOTE
Diabetes is not controlled with A1c of 9.3% (goal <7% or <7.5% due to age)  CONTINUE metformin 500  mg twice daily, Farxiga 10 mg daily, and glipizide 10 mg daily  Please submit proof of income for [employer] PAP application

## 2024-05-30 ENCOUNTER — TELEMEDICINE (OUTPATIENT)
Dept: PHARMACY | Facility: HOSPITAL | Age: 74
End: 2024-05-30
Payer: COMMERCIAL

## 2024-05-30 DIAGNOSIS — E11.69 TYPE 2 DIABETES MELLITUS WITH OTHER SPECIFIED COMPLICATION, WITH LONG-TERM CURRENT USE OF INSULIN (MULTI): ICD-10-CM

## 2024-05-30 DIAGNOSIS — Z79.4 TYPE 2 DIABETES MELLITUS WITH OTHER SPECIFIED COMPLICATION, WITH LONG-TERM CURRENT USE OF INSULIN (MULTI): ICD-10-CM

## 2024-05-30 RX ORDER — DULAGLUTIDE 0.75 MG/.5ML
0.75 INJECTION, SOLUTION SUBCUTANEOUS
Qty: 2 ML | Refills: 1 | Status: SHIPPED | OUTPATIENT
Start: 2024-06-02

## 2024-05-30 RX ORDER — DAPAGLIFLOZIN 10 MG/1
10 TABLET, FILM COATED ORAL DAILY
Qty: 90 TABLET | Refills: 3 | Status: SHIPPED | OUTPATIENT
Start: 2024-05-30

## 2024-05-31 NOTE — PROGRESS NOTES
Patient ID: Shaan Dailey is a 73 y.o. male who presents for Diabetes.  Patient was referred to pharmacy for cost assistance of GLP1. At last visit, pre-screening for  Patient Assistance Program was completed and patient was asked to submit proof of income for the application.    Spoke to patient's POA: Jessica     Referring Provider: Kelly Saha APRN-CNP  PCP: ADILSON Pickett Last visit with PCP: 5/6/24 Next visit with PCP: 8/14/24      Subjective   Treatment Adherence:   Can patient afford prescribed medications: No, Patient is unable to afford Trulicity    DISCUSSION  Proof of income was submitted for the  PAP application  Made POA aware of current back order issues of Trulicity 3 mg and 4.5 mg; Possible that inventory issues will be resolved by the time patient can be titrated to higher doses   POA has no further questions or concerns at this time  IF approved by program, will review Trulicity administration and counseling     Objective     There were no vitals taken for this visit.   BP Readings from Last 4 Encounters:   05/06/24 115/72   01/04/24 122/81   06/28/23 142/72   02/27/23 138/80      There were no vitals filed for this visit.     Labs  Lab Results   Component Value Date    BILITOT 0.9 08/22/2018    CALCIUM 11.2 (H) 04/24/2024    CO2 31 04/24/2024    CL 99 04/24/2024    CREATININE 0.90 04/24/2024    GLUCOSE 266 (H) 04/24/2024    ALKPHOS 44 08/22/2018    K 5.0 04/24/2024    PROT 6.3 (L) 08/22/2018     04/24/2024    AST 11 08/22/2018    ALT 21 08/22/2018    BUN 18 04/24/2024    ANIONGAP 13 04/24/2024    ALBUMIN 4.5 08/22/2018    GFRMALE 74 09/27/2023     Lab Results   Component Value Date    TRIG 169 (H) 03/03/2021    CHOL 105 03/03/2021    HDL 26.5 (A) 03/03/2021     Lab Results   Component Value Date    HGBA1C 9.3 (H) 04/24/2024       Current Outpatient Medications   Medication Instructions    amLODIPine (Norvasc) 10 mg tablet 1 tablet, oral, Daily    amLODIPine (NORVASC) 10 mg,  oral, Daily, for blood pressure    aspirin 325 mg tablet oral    aspirin 325 mg, oral, Daily RT    atorvastatin (LIPITOR) 40 mg, oral, Nightly    benazepril (LOTENSIN) 20 mg, oral, Daily    benazepril (LOTENSIN) 20 mg, oral, Daily    betamethasone, augmented, (Diprolene AF) 0.05 % cream 1 Application, Topical, 2 times daily    blood sugar diagnostic (Advanced Gluc Meter Test Strip) strip 2 times daily    clopidogrel (PLAVIX) 75 mg, oral, Daily    diclofenac (Voltaren) 0.1 % ophthalmic solution INSTILL 1 DROP INTO RIGHT EYE TWICE A DAY FOR 1 WEEK THEN DISCARD REMAINING    diclofenac sodium (VOLTAREN) 2 g, Topical, 2 times daily PRN, Apply to affected area    ergocalciferol (VITAMIN D-2) 50,000 Units, oral, Once Weekly    Farxiga 10 mg, oral, Daily    glipiZIDE XL (GLUCOTROL XL) 5 mg, oral, Daily with breakfast    glipiZIDE XL (GLUCOTROL XL) 10 mg, oral, Daily with breakfast    lancets (2-In-1 Lancet Device) 30 gauge misc Use twice daily to evaluate blood sugar    latanoprost (Xalatan) 0.005 % ophthalmic solution INSTILL 1 DROP INTO BOTH EYES IN THE EVENING    metFORMIN (GLUCOPHAGE) 1,000 mg, oral, 2 times daily    metoprolol succinate XL (TOPROL-XL) 50 mg, oral, Daily    ofloxacin (Ocuflox) 0.3 % ophthalmic solution ophthalmic (eye)    OneTouch Ultra Test strip TEST BG'S TWICE DAILY. DX CODE: E11.29, E11.65, NIDDM WITH HX RENAL IMPAIRMENT, HTN, CAD & A-FIB.    OneTouch Ultra Test strip USE 1 STRIP TWICE A DAY    polyethylene glycol-electrolytes 420 gram solution 4,000 mL, oral, Once as needed, Mix and drink 4000 mL as per split dose  Endoscopy Instructions    prednisoLONE acetate (Pred-Forte) 1 % ophthalmic suspension ophthalmic (eye)    terbinafine (LamISIL) 1 % cream Apply topically 2 times a day as needed for itching or irritation.    timolol (Timoptic) 0.5 % ophthalmic solution INSTILL 1 DROP LEFT EYE EVERY MORNING    [START ON 6/2/2024] Trulicity 0.75 mg, subcutaneous, Once Weekly       Assessment/Plan    Problem List Items Addressed This Visit             ICD-10-CM    Diabetes mellitus (Multi) E11.9     Diabetes is uncontrolled with A1c of 9.3% (goal <7% or 7.5% due to age)  CONTINUE metformin 500 mg twice daily, Farxiga 10 mg daily, and glipizide 10 mg daily   PAP application submitted for both Trulicity 0.75 mg and Farxiga 10 mg  Prescriptions sent to [facility] as part of application         Relevant Medications    Farxiga 10 mg    dulaglutide (Trulicity) 0.75 mg/0.5 mL pen injector (Start on 6/2/2024)         Follow-up: 1 week     Time spent with pt: Total length of time 15 (minutes) of the encounter and more than 50% was spent counseling the patient.    Susi Moore, PharmD    Continue all meds under the continuation of care with the referring provider and clinical pharmacy team.

## 2024-05-31 NOTE — ASSESSMENT & PLAN NOTE
Diabetes is uncontrolled with A1c of 9.3% (goal <7% or 7.5% due to age)  CONTINUE metformin 500 mg twice daily, Farxiga 10 mg daily, and glipizide 10 mg daily   PAP application submitted for both Trulicity 0.75 mg and Farxiga 10 mg  Prescriptions sent to [facility] as part of application

## 2024-06-03 ENCOUNTER — TELEMEDICINE (OUTPATIENT)
Dept: PHARMACY | Facility: HOSPITAL | Age: 74
End: 2024-06-03
Payer: COMMERCIAL

## 2024-06-03 DIAGNOSIS — Z79.4 TYPE 2 DIABETES MELLITUS WITH OTHER SPECIFIED COMPLICATION, WITH LONG-TERM CURRENT USE OF INSULIN (MULTI): Primary | ICD-10-CM

## 2024-06-03 DIAGNOSIS — E11.69 TYPE 2 DIABETES MELLITUS WITH OTHER SPECIFIED COMPLICATION, WITH LONG-TERM CURRENT USE OF INSULIN (MULTI): Primary | ICD-10-CM

## 2024-06-03 PROCEDURE — RXMED WILLOW AMBULATORY MEDICATION CHARGE

## 2024-06-03 NOTE — PROGRESS NOTES
Patient ID: Shaan Dailey is a 73 y.o. male who presents for Diabetes.    Spoke to Jessica MYERS.    Referring Provider: Kelly Saha APRN-CNP  PCP: ADILSON Pickett Last visit with PCP: 5/26/24 Next visit with PCP: 8/14/24      Subjective   Treatment Adherence:   Can patient afford prescribed medications: No, Patient unable to afford Trulicity    DISCUSSION  Patient was approved for  Patient Assistance Program for Trulicity  Active benefit period is through 6/3/25  Farxiga will be added to profile in July as it is refill too soon until 7/3  Patient is aware that application will need to be renewed for the following year to continue in the program   No further questions or concerns at this time    Objective     There were no vitals taken for this visit.   BP Readings from Last 4 Encounters:   05/06/24 115/72   01/04/24 122/81   06/28/23 142/72   02/27/23 138/80      There were no vitals filed for this visit.     Labs  Lab Results   Component Value Date    BILITOT 0.9 08/22/2018    CALCIUM 11.2 (H) 04/24/2024    CO2 31 04/24/2024    CL 99 04/24/2024    CREATININE 0.90 04/24/2024    GLUCOSE 266 (H) 04/24/2024    ALKPHOS 44 08/22/2018    K 5.0 04/24/2024    PROT 6.3 (L) 08/22/2018     04/24/2024    AST 11 08/22/2018    ALT 21 08/22/2018    BUN 18 04/24/2024    ANIONGAP 13 04/24/2024    ALBUMIN 4.5 08/22/2018    GFRMALE 74 09/27/2023     Lab Results   Component Value Date    TRIG 169 (H) 03/03/2021    CHOL 105 03/03/2021    HDL 26.5 (A) 03/03/2021     Lab Results   Component Value Date    HGBA1C 9.3 (H) 04/24/2024       Current Outpatient Medications   Medication Instructions    amLODIPine (Norvasc) 10 mg tablet 1 tablet, oral, Daily    amLODIPine (NORVASC) 10 mg, oral, Daily, for blood pressure    aspirin 325 mg tablet oral    aspirin 325 mg, oral, Daily RT    atorvastatin (LIPITOR) 40 mg, oral, Nightly    benazepril (LOTENSIN) 20 mg, oral, Daily    benazepril (LOTENSIN) 20 mg, oral, Daily     betamethasone, augmented, (Diprolene AF) 0.05 % cream 1 Application, Topical, 2 times daily    blood sugar diagnostic (Advanced Gluc Meter Test Strip) strip 2 times daily    clopidogrel (PLAVIX) 75 mg, oral, Daily    diclofenac (Voltaren) 0.1 % ophthalmic solution INSTILL 1 DROP INTO RIGHT EYE TWICE A DAY FOR 1 WEEK THEN DISCARD REMAINING    diclofenac sodium (VOLTAREN) 2 g, Topical, 2 times daily PRN, Apply to affected area    ergocalciferol (VITAMIN D-2) 50,000 Units, oral, Once Weekly    Farxiga 10 mg, oral, Daily    glipiZIDE XL (GLUCOTROL XL) 5 mg, oral, Daily with breakfast    glipiZIDE XL (GLUCOTROL XL) 10 mg, oral, Daily with breakfast    lancets (2-In-1 Lancet Device) 30 gauge misc Use twice daily to evaluate blood sugar    latanoprost (Xalatan) 0.005 % ophthalmic solution INSTILL 1 DROP INTO BOTH EYES IN THE EVENING    metFORMIN (GLUCOPHAGE) 1,000 mg, oral, 2 times daily    metoprolol succinate XL (TOPROL-XL) 50 mg, oral, Daily    ofloxacin (Ocuflox) 0.3 % ophthalmic solution ophthalmic (eye)    OneTouch Ultra Test strip TEST BG'S TWICE DAILY. DX CODE: E11.29, E11.65, NIDDM WITH HX RENAL IMPAIRMENT, HTN, CAD & A-FIB.    OneTouch Ultra Test strip USE 1 STRIP TWICE A DAY    polyethylene glycol-electrolytes 420 gram solution 4,000 mL, oral, Once as needed, Mix and drink 4000 mL as per split dose  Endoscopy Instructions    prednisoLONE acetate (Pred-Forte) 1 % ophthalmic suspension ophthalmic (eye)    terbinafine (LamISIL) 1 % cream Apply topically 2 times a day as needed for itching or irritation.    timolol (Timoptic) 0.5 % ophthalmic solution INSTILL 1 DROP LEFT EYE EVERY MORNING    Trulicity 0.75 mg, subcutaneous, Once Weekly         Assessment/Plan   Diabetes is uncontrolled with A1c of 9.3% (goal <7% or 7.5% due to age)  RESTART Trulicity 0.75 mg under the skin once weekly  CONTINUE metformin 500 mg twice daily, Farxiga 10 mg daily, and glipizide 10 mg daily        Follow-up: 6/26 to assess  tolerability to Trulicity 0.75  mg and to assist in titrations      Time spent with pt: Total length of time 15 (minutes) of the encounter and more than 50% was spent counseling the patient.    Susi Moore, PharmD    Continue all meds under the continuation of care with the referring provider and clinical pharmacy team.

## 2024-06-07 ENCOUNTER — PHARMACY VISIT (OUTPATIENT)
Dept: PHARMACY | Facility: CLINIC | Age: 74
End: 2024-06-07
Payer: COMMERCIAL

## 2024-06-26 ENCOUNTER — APPOINTMENT (OUTPATIENT)
Dept: PHARMACY | Facility: HOSPITAL | Age: 74
End: 2024-06-26
Payer: COMMERCIAL

## 2024-06-26 DIAGNOSIS — E11.69 TYPE 2 DIABETES MELLITUS WITH OTHER SPECIFIED COMPLICATION, WITH LONG-TERM CURRENT USE OF INSULIN (MULTI): Primary | ICD-10-CM

## 2024-06-26 DIAGNOSIS — Z79.4 TYPE 2 DIABETES MELLITUS WITH OTHER SPECIFIED COMPLICATION, WITH LONG-TERM CURRENT USE OF INSULIN (MULTI): Primary | ICD-10-CM

## 2024-06-26 PROCEDURE — RXMED WILLOW AMBULATORY MEDICATION CHARGE

## 2024-06-26 RX ORDER — DULAGLUTIDE 1.5 MG/.5ML
1.5 INJECTION, SOLUTION SUBCUTANEOUS
Qty: 2 ML | Refills: 1 | Status: SHIPPED | OUTPATIENT
Start: 2024-06-30

## 2024-06-26 ASSESSMENT — ENCOUNTER SYMPTOMS: DIABETIC ASSOCIATED SYMPTOMS: 0

## 2024-06-26 NOTE — Clinical Note
Hello!  Patient is wondering if he can get a refill on his vitamin D 50,000 units? I let him know that I would pass on the message.  Thanks!

## 2024-06-26 NOTE — PROGRESS NOTES
Patient ID: Shaan Dailey is a 73 y.o. male who presents for Diabetes.    Referring Provider: Kelly Saha APRN-CNP  PCP: ADILSON Pickett Last visit with PCP: 24 Next visit with PCP: 24       Subjective   Treatment Adherence:   Patient did take medications today.   Number of missed doses in last 7 days: 0.       Preferred pharmacy: [facility] for Trulicity  Can patient afford prescribed medications: Yes, Patient is enrolled for [facility] to receive Trulicity at $0.    Diabetes  He presents for his follow-up diabetic visit. He has type 2 diabetes mellitus. His disease course has been stable. There are no hypoglycemic associated symptoms. There are no diabetic associated symptoms.     DISCUSSION  Patient has restarted Trulicity and has been tolerating it well; denies side effects  Patient was previously established on Trulicity 1.5 mg before discontinuing due to cost  FB today  Lowest readin  Previous readings often >200  Denies signs of hypoglycemia  Patient inquires about vitamin D refill  Will message PCP  Patient has no further questions or concerns    Objective     There were no vitals taken for this visit.   BP Readings from Last 4 Encounters:   24 115/72   24 122/81   23 142/72   23 138/80      There were no vitals filed for this visit.     Labs  Lab Results   Component Value Date    BILITOT 0.9 2018    CALCIUM 11.2 (H) 2024    CO2 31 2024    CL 99 2024    CREATININE 0.90 2024    GLUCOSE 266 (H) 2024    ALKPHOS 44 2018    K 5.0 2024    PROT 6.3 (L) 2018     2024    AST 11 2018    ALT 21 2018    BUN 18 2024    ANIONGAP 13 2024    ALBUMIN 4.5 2018    GFRMALE 74 2023     Lab Results   Component Value Date    TRIG 169 (H) 2021    CHOL 105 2021    HDL 26.5 (A) 2021     Lab Results   Component Value Date    HGBA1C 9.3 (H) 2024       Current  Outpatient Medications   Medication Instructions    amLODIPine (Norvasc) 10 mg tablet 1 tablet, oral, Daily    amLODIPine (NORVASC) 10 mg, oral, Daily, for blood pressure    aspirin 325 mg tablet oral    aspirin 325 mg, oral, Daily RT    atorvastatin (LIPITOR) 40 mg, oral, Nightly    benazepril (LOTENSIN) 20 mg, oral, Daily    benazepril (LOTENSIN) 20 mg, oral, Daily    betamethasone, augmented, (Diprolene AF) 0.05 % cream 1 Application, Topical, 2 times daily    blood sugar diagnostic (Advanced Gluc Meter Test Strip) strip 2 times daily    clopidogrel (PLAVIX) 75 mg, oral, Daily    diclofenac (Voltaren) 0.1 % ophthalmic solution INSTILL 1 DROP INTO RIGHT EYE TWICE A DAY FOR 1 WEEK THEN DISCARD REMAINING    diclofenac sodium (VOLTAREN) 2 g, Topical, 2 times daily PRN, Apply to affected area    ergocalciferol (VITAMIN D-2) 50,000 Units, oral, Once Weekly    Farxiga 10 mg, oral, Daily    glipiZIDE XL (GLUCOTROL XL) 5 mg, oral, Daily with breakfast    glipiZIDE XL (GLUCOTROL XL) 10 mg, oral, Daily with breakfast    lancets (2-In-1 Lancet Device) 30 gauge misc Use twice daily to evaluate blood sugar    latanoprost (Xalatan) 0.005 % ophthalmic solution INSTILL 1 DROP INTO BOTH EYES IN THE EVENING    metFORMIN (GLUCOPHAGE) 1,000 mg, oral, 2 times daily    metoprolol succinate XL (TOPROL-XL) 50 mg, oral, Daily    ofloxacin (Ocuflox) 0.3 % ophthalmic solution ophthalmic (eye)    OneTouch Ultra Test strip TEST BG'S TWICE DAILY. DX CODE: E11.29, E11.65, NIDDM WITH HX RENAL IMPAIRMENT, HTN, CAD & A-FIB.    OneTouch Ultra Test strip USE 1 STRIP TWICE A DAY    polyethylene glycol-electrolytes 420 gram solution 4,000 mL, oral, Once as needed, Mix and drink 4000 mL as per split dose  Endoscopy Instructions    prednisoLONE acetate (Pred-Forte) 1 % ophthalmic suspension ophthalmic (eye)    terbinafine (LamISIL) 1 % cream Apply topically 2 times a day as needed for itching or irritation.    timolol (Timoptic) 0.5 % ophthalmic  solution INSTILL 1 DROP LEFT EYE EVERY MORNING    [START ON 6/30/2024] Trulicity 1.5 mg, subcutaneous, Once Weekly         Assessment/Plan   Problem List Items Addressed This Visit             ICD-10-CM    Diabetes mellitus (Multi) - Primary E11.9     Diabetes is uncontrolled with A1c of 9.3% (goal <7% or 7.5% due to age)  INCREASE to Trulicity 1.5 mg under the skin once weekly for further glycemic control  Prescription sent to [facility] for delivery   CONTINUE Farxiga 10 mg daily, metformin 500 mg twice daily, and glipizide 10 mg daily         Relevant Medications    dulaglutide (Trulicity) 1.5 mg/0.5 mL pen injector injection (Start on 6/30/2024)         Follow-up: 7/24 to assess tolerability of Trulicity 1.5 mg     Time spent with pt: Total length of time 15 (minutes) of the encounter and more than 50% was spent counseling the patient.    Susi Moore, PharmD    Continue all meds under the continuation of care with the referring provider and clinical pharmacy team.

## 2024-06-26 NOTE — ASSESSMENT & PLAN NOTE
Diabetes is uncontrolled with A1c of 9.3% (goal <7% or 7.5% due to age)  INCREASE to Trulicity 1.5 mg under the skin once weekly for further glycemic control  Prescription sent to [facility] for delivery   CONTINUE Farxiga 10 mg daily, metformin 500 mg twice daily, and glipizide 10 mg daily

## 2024-06-28 ENCOUNTER — PHARMACY VISIT (OUTPATIENT)
Dept: PHARMACY | Facility: CLINIC | Age: 74
End: 2024-06-28
Payer: COMMERCIAL

## 2024-07-06 DIAGNOSIS — I10 BENIGN ESSENTIAL HYPERTENSION: Primary | ICD-10-CM

## 2024-07-08 RX ORDER — AMLODIPINE BESYLATE 10 MG/1
10 TABLET ORAL DAILY
Qty: 90 TABLET | Refills: 3 | Status: SHIPPED | OUTPATIENT
Start: 2024-07-08

## 2024-07-12 DIAGNOSIS — E13.69 OTHER SPECIFIED DIABETES MELLITUS WITH OTHER SPECIFIED COMPLICATION, WITH LONG-TERM CURRENT USE OF INSULIN (MULTI): ICD-10-CM

## 2024-07-12 DIAGNOSIS — Z79.4 OTHER SPECIFIED DIABETES MELLITUS WITH OTHER SPECIFIED COMPLICATION, WITH LONG-TERM CURRENT USE OF INSULIN (MULTI): ICD-10-CM

## 2024-07-12 NOTE — PROGRESS NOTES
Patient just increased on Trulicity, tolerating dose well. Restarted on Farxiga now in PAP program. Patient was requesting refill of glipizide. Not currently using CGM. Will hold glipizide for now due to risk for hypoglycemia. Discussed with patient who endorsed understanding.

## 2024-07-15 RX ORDER — GLIPIZIDE 5 MG/1
5 TABLET, FILM COATED, EXTENDED RELEASE ORAL
Qty: 90 TABLET | Refills: 2 | OUTPATIENT
Start: 2024-07-15

## 2024-07-24 ENCOUNTER — APPOINTMENT (OUTPATIENT)
Dept: PHARMACY | Facility: HOSPITAL | Age: 74
End: 2024-07-24
Payer: COMMERCIAL

## 2024-07-29 DIAGNOSIS — E11.9 TYPE 2 DIABETES MELLITUS WITHOUT COMPLICATION, WITHOUT LONG-TERM CURRENT USE OF INSULIN (MULTI): Primary | ICD-10-CM

## 2024-07-30 ENCOUNTER — APPOINTMENT (OUTPATIENT)
Dept: RADIOLOGY | Facility: HOSPITAL | Age: 74
End: 2024-07-30
Payer: COMMERCIAL

## 2024-08-07 ENCOUNTER — CLINICAL SUPPORT (OUTPATIENT)
Dept: PRIMARY CARE | Facility: CLINIC | Age: 74
End: 2024-08-07
Payer: COMMERCIAL

## 2024-08-07 ENCOUNTER — TELEPHONE (OUTPATIENT)
Dept: PHARMACY | Facility: HOSPITAL | Age: 74
End: 2024-08-07

## 2024-08-07 ENCOUNTER — PHARMACY VISIT (OUTPATIENT)
Dept: PHARMACY | Facility: CLINIC | Age: 74
End: 2024-08-07
Payer: COMMERCIAL

## 2024-08-07 DIAGNOSIS — E11.9 TYPE 2 DIABETES MELLITUS WITHOUT COMPLICATION, WITHOUT LONG-TERM CURRENT USE OF INSULIN (MULTI): Primary | ICD-10-CM

## 2024-08-07 DIAGNOSIS — E11.9 TYPE 2 DIABETES MELLITUS WITHOUT COMPLICATION, WITHOUT LONG-TERM CURRENT USE OF INSULIN (MULTI): ICD-10-CM

## 2024-08-07 DIAGNOSIS — Z79.4 TYPE 2 DIABETES MELLITUS WITH OTHER SPECIFIED COMPLICATION, WITH LONG-TERM CURRENT USE OF INSULIN (MULTI): ICD-10-CM

## 2024-08-07 DIAGNOSIS — E11.69 TYPE 2 DIABETES MELLITUS WITH OTHER SPECIFIED COMPLICATION, WITH LONG-TERM CURRENT USE OF INSULIN (MULTI): ICD-10-CM

## 2024-08-07 LAB
EST. AVERAGE GLUCOSE BLD GHB EST-MCNC: 154 MG/DL
HBA1C MFR BLD: 7 %

## 2024-08-07 PROCEDURE — RXMED WILLOW AMBULATORY MEDICATION CHARGE

## 2024-08-07 PROCEDURE — 36415 COLL VENOUS BLD VENIPUNCTURE: CPT

## 2024-08-07 PROCEDURE — 83036 HEMOGLOBIN GLYCOSYLATED A1C: CPT

## 2024-08-07 RX ORDER — DULAGLUTIDE 1.5 MG/.5ML
1.5 INJECTION, SOLUTION SUBCUTANEOUS
Qty: 2 ML | Refills: 2 | Status: SHIPPED | OUTPATIENT
Start: 2024-08-11 | End: 2024-08-14 | Stop reason: DRUGHIGH

## 2024-08-14 ENCOUNTER — OFFICE VISIT (OUTPATIENT)
Dept: PRIMARY CARE | Facility: CLINIC | Age: 74
End: 2024-08-14
Payer: COMMERCIAL

## 2024-08-14 ENCOUNTER — APPOINTMENT (OUTPATIENT)
Dept: PHARMACY | Facility: HOSPITAL | Age: 74
End: 2024-08-14
Payer: COMMERCIAL

## 2024-08-14 VITALS
RESPIRATION RATE: 14 BRPM | WEIGHT: 209 LBS | BODY MASS INDEX: 33.59 KG/M2 | DIASTOLIC BLOOD PRESSURE: 77 MMHG | TEMPERATURE: 96.9 F | SYSTOLIC BLOOD PRESSURE: 125 MMHG | HEIGHT: 66 IN | HEART RATE: 72 BPM | OXYGEN SATURATION: 95 %

## 2024-08-14 DIAGNOSIS — E11.69 TYPE 2 DIABETES MELLITUS WITH OTHER SPECIFIED COMPLICATION, WITH LONG-TERM CURRENT USE OF INSULIN (MULTI): ICD-10-CM

## 2024-08-14 DIAGNOSIS — E11.9 TYPE 2 DIABETES MELLITUS WITHOUT COMPLICATION, WITHOUT LONG-TERM CURRENT USE OF INSULIN (MULTI): Primary | ICD-10-CM

## 2024-08-14 DIAGNOSIS — Z79.4 TYPE 2 DIABETES MELLITUS WITH OTHER SPECIFIED COMPLICATION, WITH LONG-TERM CURRENT USE OF INSULIN (MULTI): ICD-10-CM

## 2024-08-14 PROCEDURE — 1126F AMNT PAIN NOTED NONE PRSNT: CPT | Performed by: NURSE PRACTITIONER

## 2024-08-14 PROCEDURE — 3078F DIAST BP <80 MM HG: CPT | Performed by: NURSE PRACTITIONER

## 2024-08-14 PROCEDURE — 3074F SYST BP LT 130 MM HG: CPT | Performed by: NURSE PRACTITIONER

## 2024-08-14 PROCEDURE — 1159F MED LIST DOCD IN RCRD: CPT | Performed by: NURSE PRACTITIONER

## 2024-08-14 PROCEDURE — 4010F ACE/ARB THERAPY RXD/TAKEN: CPT | Performed by: NURSE PRACTITIONER

## 2024-08-14 PROCEDURE — 99213 OFFICE O/P EST LOW 20 MIN: CPT | Performed by: NURSE PRACTITIONER

## 2024-08-14 PROCEDURE — 3008F BODY MASS INDEX DOCD: CPT | Performed by: NURSE PRACTITIONER

## 2024-08-14 PROCEDURE — 3048F LDL-C <100 MG/DL: CPT | Performed by: NURSE PRACTITIONER

## 2024-08-14 PROCEDURE — 3051F HG A1C>EQUAL 7.0%<8.0%: CPT | Performed by: NURSE PRACTITIONER

## 2024-08-14 PROCEDURE — RXMED WILLOW AMBULATORY MEDICATION CHARGE

## 2024-08-14 PROCEDURE — 3061F NEG MICROALBUMINURIA REV: CPT | Performed by: NURSE PRACTITIONER

## 2024-08-14 RX ORDER — GLUCOSAM/CHONDRO/HERB 149/HYAL 750-100 MG
1 TABLET ORAL DAILY
COMMUNITY

## 2024-08-14 ASSESSMENT — PAIN SCALES - GENERAL: PAINLEVEL: 0-NO PAIN

## 2024-08-14 NOTE — PROGRESS NOTES
Primary Care Clinical Pharmacist Follow-up Visit      Date of Follow-up Visit: 8/14/24  Disease state(s) to be managed by clinical pharmacist: DMT2  Referring Provider: ADILSON Pickett (PCP)  Most recent appointment with PCP: Today(8/14/24)  Next appointment with PCP: 12/12/24              Subjective:    Patient is a 72 YO M who presents in-person with his ex-sister-in-law to visit with clinical pharmacist for follow-up visit for management of DMT2. Patient gives permission for ex-sister-in-law to be present at visit with clinical pharmacist. Patient was referred to clinical pharmacist for management of disease state(s) by PCP. At today's visit, patient reports taking dulaglutide 1.5mg subcutaneous weekly and metformin 1000mg PO BID. Patient reports compliance with meds and denies any ADRs to meds. Of note, patient has already taken at least four doses of dulaglutide 1.5mg weekly.    Patient reports eating 3 meals/day  Breakfast: scramble egg with spinach, sausage, potatoes  Lunch: varies  Dinner: salad  Snack: grapes, banana  Drinks: water, milk, coffee with some cream and sugar    Exercise: walking half an mile, or work out half an hour to one hour every day    Objective:    Most recent hemoglobin A1C level: 7% (8/7/24) (goal: less than 7%)    Patient checks glucose level at home 1 time per day.  Below are patient's home glucose readings for the past 2 weeks:    Before breakfast: 122, 136, 154,151,156, 159,160,168, 153, 143, 146,146,189 (Avg = 153 mg/dL, goal:  mg/dL)    Most recent eGFR: 90(4/24/24)  Most recent vitamin B12 level: Not checked in the past 12 months  Most recent Albumin/SCr ratio: 6.5(2/15/22)  Most recent AST/ALT: 11/21 (8/22/18)  Most recent diabetic retinopathy exam: last appointment was July, 2024(diabetic eye exam was performed at most recent visit and was negative for diabetic retinopathy in both eyes)  Most recent influenza vaccine:  10/22/20  Most recent pneumococcal vaccine: 10/22/17 (PCV13)    Below are the result's of patient's most recent fasting lipid panel: (3/3/21)    LDL: 45(goal: less than 55)  TG : 169(goal: less than 500)         HDL:  26.5 (goal: greater than 40)    TC: 105 (goal: less than 200)  The above lab results are reflective of current therapy with atorvastatin 40mg PO daily    Assessment:  Patient's DMT2 is currently near goal based on lastest A1C of 7%(from 8/7/24). However, the average of patient's home glucose readings is above the goal. Patient is currently taking dulaglutide 1.5mg subcutaneous weekly and metformin 1000mg PO BID. Patient is compliant with meds and denies any ADRs to meds. Of note, patient has already taken at least four doses of dulaglutide 1.5mg weekly.      Plan:   - Increase Trulicity from 1.5mg subcutaneous weekly to 3mg subcutaneous weelky              - Order placed for Trulicity 3 mg subcutaneous weekly              - Instruct patient to start administering Trulicity 3mg if he receives Trulicity from the pharmacy before the Saturday dose this week, or continuing Trulicity 1.5mg one additional week if he does not receive Trulicity 3mg pen before this Saturday  - Continue metformin 1000mg PO BID  - Instructed patient to go to lab prior to next visit with clinical pharmacist to have vitamin B12 level, alb/Scr ratio, CMP, and lipid panel checked      The patient and his ex-sister-in-law verbalized understanding of everything discussed at today's visit and agree with plan formulated by clinical pharmacist    Next visit with clinical pharmacist:  9/9/24 at 4:30 PM      Continue all meds under the continuation of care with the referring provider and clinical pharmacy team.    Patient Education    The patient and his ex-sister-in-law were counseled on the following regarding DMT2    - The side effects of the medications patient uses to treat disease state(s)  - The signs and symptoms of hypoglycemia and  what to do if it occurs    John Singleton PharmD  [employer] PGY1 Pharmacy Resident  [facility]     Addendum:     Patient was seen in conjunction with resident. I have reviewed the resident's progress note associated with the patient's visit, and I agree with the resident's assessment and plan.      Jose Luis Hills, PharmD, CP, BCACP  Clinical Pharmacy Specialist  [facility]

## 2024-08-14 NOTE — PATIENT INSTRUCTIONS
Thank you for coming in for your visit today!    Please follow up in 4 months or sooner if needed.    Keep up the great work with your blood sugar management!   Continue to self monitor your blood sugar and take your medications, as directed.   Decrease refined sugars and carbohydrates in your diet.   DO NOT SKIP MEALS! It is important for your blood sugar to have small healthy meals throughout the day.  Make sure to keep a snack on your person at all times, if needed, for low blood sugar.  Exercise for 30 minutes daily.    Drink adequate liquids before, during, and after exercise to prevent dehydration, which can alter blood sugar levels.    Come before your next appointment for blood work.     Call 911 or go to the emergency room if you have pain in your chest, difficulty breathing, or other life threatening symptoms.

## 2024-08-14 NOTE — PROGRESS NOTES
"Subjective   Shaan Dailey is a 73 y.o. male who presents for Follow-up.  HPI  Mr. Dailey is a 74 yo M here today for follow up.  Is now in the pharmacy program, including PAP. He is again receiving his GLP medication. With reinstatement of medication his A1C is now down to 7.0.   Denies polydipsia, polyuria, headache, blurred vision, or lightheadedness.He would like to focus on increasing his exercise. He notes that he is outside daily but is not always as active as he feels he should.       All systems reviewed. Review of systems negative except for noted positives in HPI    Objective     /77   Pulse 72   Temp 36.1 °C (96.9 °F)   Resp 14   Ht 1.676 m (5' 6\")   Wt 94.8 kg (209 lb)   SpO2 95%   BMI 33.73 kg/m²    Vital signs noted and reviewed.       Physical Exam  Constitutional:       Appearance: Normal appearance.   Cardiovascular:      Rate and Rhythm: Normal rate and regular rhythm.   Pulmonary:      Effort: Pulmonary effort is normal. No respiratory distress.      Breath sounds: Normal breath sounds.   Skin:     General: Skin is warm and dry.   Neurological:      Mental Status: He is oriented to person, place, and time.   Psychiatric:         Mood and Affect: Mood normal.             Assessment/Plan   Problem List Items Addressed This Visit       Diabetes mellitus (Multi) - Primary               "

## 2024-08-15 ENCOUNTER — PHARMACY VISIT (OUTPATIENT)
Dept: PHARMACY | Facility: CLINIC | Age: 74
End: 2024-08-15
Payer: COMMERCIAL

## 2024-08-15 NOTE — ASSESSMENT & PLAN NOTE
Assessment:  Patient's DMT2 is currently uncontrolled. Patient self-reported average FBG in the past 2 weeks is above the goal. Patient is well-tolerated and adherent to the medications. Patient denies any symptoms of hypoglycemia in the past 2 weeks.  Plan:   - Increase Trulicity from 1.5mg subcutaneous weekly to 3mg subcutaneous weelky   - Order placed for Trulicity 3 mg subcutaneous weekly   - Instruct patient to start administering Trulicity 3mg if he can  Trulicity from the pharmacy before the Saturday dose this week, or continuing Trulicity 1.5mg one additional week if he cannot  Trulicity 3mg pen before this Saturday  - Continue metformin 1000mg PO BID   - Order placed for vitamin B12 lab  - Continue dapagliflozin 10mg PO daily   - Order placed for Alb/Scr recheck  - Order placed for CMP and lipid panel lab   - Instruct patient to go to [facility] for lab before next visit  - Follow-up  in-person appointment on 9/9 at 4:30 pm

## 2024-08-16 NOTE — ASSESSMENT & PLAN NOTE
Assessment:  Patient's DMT2 is currently near goal based on lastest A1C of 7%(from 8/7/24). However, the average of patient's home glucose readings is above the goal. Patient is currently taking dulaglutide 1.5mg subcutaneous weekly and metformin 1000mg PO BID. Patient is compliant with meds and denies any ADRs to meds. Of note, patient has already taken at least four doses of dulaglutide 1.5mg weekly.     Plan:   - Increase Trulicity from 1.5mg subcutaneous weekly to 3mg subcutaneous weelky   - Order placed for Trulicity 3 mg subcutaneous weekly   - Instruct patient to start administering Trulicity 3mg if he receives Trulicity from the pharmacy before the Saturday dose this week, or continuing Trulicity 1.5mg one additional week if he does not receive Trulicity 3mg pen before this Saturday  - Continue metformin 1000mg PO BID  - Instructed patient to go to lab prior to next visit with clinical pharmacist to have vitamin B12 level, alb/Scr ratio, CMP, and lipid panel checked

## 2024-08-19 ENCOUNTER — CLINICAL SUPPORT (OUTPATIENT)
Dept: PRIMARY CARE | Facility: CLINIC | Age: 74
End: 2024-08-19
Payer: COMMERCIAL

## 2024-08-19 DIAGNOSIS — E11.9 TYPE 2 DIABETES MELLITUS WITHOUT COMPLICATION, WITHOUT LONG-TERM CURRENT USE OF INSULIN (MULTI): ICD-10-CM

## 2024-08-19 DIAGNOSIS — E11.9 TYPE 2 DIABETES MELLITUS WITHOUT COMPLICATION, WITHOUT LONG-TERM CURRENT USE OF INSULIN (MULTI): Primary | ICD-10-CM

## 2024-08-19 LAB
CREAT UR-MCNC: 199.2 MG/DL (ref 20–370)
MICROALBUMIN UR-MCNC: 16.5 MG/L
MICROALBUMIN/CREAT UR: 8.3 UG/MG CREAT
VIT B12 SERPL-MCNC: 245 PG/ML (ref 211–911)

## 2024-08-19 PROCEDURE — 36415 COLL VENOUS BLD VENIPUNCTURE: CPT

## 2024-08-19 PROCEDURE — 82607 VITAMIN B-12: CPT

## 2024-08-19 PROCEDURE — 80053 COMPREHEN METABOLIC PANEL: CPT

## 2024-08-19 PROCEDURE — 80061 LIPID PANEL: CPT

## 2024-08-19 PROCEDURE — 82570 ASSAY OF URINE CREATININE: CPT

## 2024-08-19 RX ORDER — BLOOD SUGAR DIAGNOSTIC
STRIP MISCELLANEOUS
Qty: 100 EACH | Refills: 6 | Status: SHIPPED | OUTPATIENT
Start: 2024-08-19

## 2024-08-20 ENCOUNTER — TELEPHONE (OUTPATIENT)
Dept: PHARMACY | Facility: HOSPITAL | Age: 74
End: 2024-08-20
Payer: COMMERCIAL

## 2024-08-20 NOTE — TELEPHONE ENCOUNTER
Patient went to lab on 8/19/24 to have CMP, lipid panel, vitamin B12 level (patient is on metformin), and albumin/SCr ratio checked. Aside from glucose level returning elevated (patient is diabetic) and calcium level returning slightly elevated, all components of CMP returned WNL (specifically eGFR returned at greater than 90 and AST/ALT returned at 16/21). Rechecked lipid panel revealed the following: LDL 30 (goal: less than 55) and  (goal: less than 500). Vitamin B12 level and albumin/SCr ratio both returned WNL.     Spoke with patient and relayed results of labwork to him. Patient verbalized understanding of everything.

## 2024-08-21 LAB
ALBUMIN SERPL BCP-MCNC: 4.6 G/DL (ref 3.4–5)
ALP SERPL-CCNC: 42 U/L (ref 33–136)
ALT SERPL W P-5'-P-CCNC: 21 U/L (ref 10–52)
ANION GAP SERPL CALC-SCNC: 15 MMOL/L (ref 10–20)
AST SERPL W P-5'-P-CCNC: 16 U/L (ref 9–39)
BILIRUB SERPL-MCNC: 1 MG/DL (ref 0–1.2)
BUN SERPL-MCNC: 21 MG/DL (ref 6–23)
CALCIUM SERPL-MCNC: 10.7 MG/DL (ref 8.6–10.6)
CHLORIDE SERPL-SCNC: 105 MMOL/L (ref 98–107)
CHOLEST SERPL-MCNC: 99 MG/DL (ref 0–199)
CHOLESTEROL/HDL RATIO: 3.5
CO2 SERPL-SCNC: 24 MMOL/L (ref 21–32)
CREAT SERPL-MCNC: 0.88 MG/DL (ref 0.5–1.3)
EGFRCR SERPLBLD CKD-EPI 2021: >90 ML/MIN/1.73M*2
GLUCOSE SERPL-MCNC: 151 MG/DL (ref 74–99)
HDLC SERPL-MCNC: 27.9 MG/DL
LDLC SERPL CALC-MCNC: 30 MG/DL
NON HDL CHOLESTEROL: 71 MG/DL (ref 0–149)
POTASSIUM SERPL-SCNC: 4.8 MMOL/L (ref 3.5–5.3)
PROT SERPL-MCNC: 6.4 G/DL (ref 6.4–8.2)
SODIUM SERPL-SCNC: 139 MMOL/L (ref 136–145)
TRIGL SERPL-MCNC: 207 MG/DL (ref 0–149)
VLDL: 41 MG/DL (ref 0–40)

## 2024-08-30 ENCOUNTER — APPOINTMENT (OUTPATIENT)
Dept: PHARMACY | Facility: HOSPITAL | Age: 74
End: 2024-08-30
Payer: COMMERCIAL

## 2024-09-09 ENCOUNTER — APPOINTMENT (OUTPATIENT)
Dept: PHARMACY | Facility: HOSPITAL | Age: 74
End: 2024-09-09
Payer: COMMERCIAL

## 2024-09-09 VITALS
HEART RATE: 86 BPM | DIASTOLIC BLOOD PRESSURE: 77 MMHG | SYSTOLIC BLOOD PRESSURE: 129 MMHG | WEIGHT: 210.4 LBS | BODY MASS INDEX: 33.96 KG/M2

## 2024-09-09 DIAGNOSIS — E11.9 TYPE 2 DIABETES MELLITUS WITHOUT COMPLICATION, WITHOUT LONG-TERM CURRENT USE OF INSULIN (MULTI): ICD-10-CM

## 2024-09-09 PROCEDURE — RXMED WILLOW AMBULATORY MEDICATION CHARGE

## 2024-09-09 NOTE — PROGRESS NOTES
Primary Care Clinical Pharmacist Follow-up Visit      Date of Follow-up Visit: 9/9/24  Disease state(s) to be managed by clinical pharmacist: DMT2  Referring Provider: ADILSON Pickett (PCP)  Most recent appointment with PCP: 8/14/24  Next appointment with PCP: 12/12/24              Subjective:    Patient is a 74 YO M who presents in-person with his ex-sister-in-law to visit with clinical pharmacist for follow-up visit for management of DMT2. Patient gives permission for ex-sister-in-law to be present at visit with clinical pharmacist. Patient was referred to clinical pharmacist for management of disease state(s) by PCP. At last visit with clinical pharmacist on 8/14/24, Trulicity dose was increased from 1.5mg subcutaneous weekly to 3mg subcutaneous weekly, and patient was continued on metformin 1000mg PO BID. At today's visit, patient reports taking Trulicity 3mg subcutaneous weekly and metformin 1000mg PO BID. Patient reports compliance with meds and denies any ADRs to meds. Of note, patient has already taken four doses of Trulicity 3mg weekly.    Patient reports eating 3 meals/day  Breakfast: scramble egg with spinach, sausage, potatoes  Lunch: varies  Dinner: salad  Snack: grapes, banana  Drinks: water, milk, coffee with some cream and sugar    **Patient reports increase in carbohydrate intake since last visit with clinical pharmacist**    Exercise: walking half an mile, or work out half an hour to one hour every day    Objective:    Most recent hemoglobin A1C level: 7% (8/7/24) (goal: less than 7%)    Patient checks glucose level at home 1 time per day.  Below are patient's home glucose readings (all readings morning fasting) since last visit with clinical pharmacist on 8/14/24:    155, 174, 150, 153, 166, 124, 163, 160, 167, 150, 154, 161, 172, 179, 166, 179, 160 (Avg =161 mg/dL, goal:  mg/dL)    Most recent eGFR: greater than 90 (8/19/24)  Most recent vitamin B12  level: 245 (8/19/24)  Most recent Albumin/SCr ratio: 8.3 (8/19/24)  Most recent AST/ALT: 16/21 (8/19/24)  Most recent diabetic retinopathy exam: last appointment was July, 2024(diabetic eye exam was performed at most recent visit and was negative for diabetic retinopathy in both eyes)  Most recent influenza vaccine: 10/22/20  Most recent pneumococcal vaccine: 10/22/17 (PCV13)    Below are the result's of patient's most recent fasting lipid panel: (8/19/24)    LDL: 30 (goal: less than 55)  TG : 207 (goal: less than 500)         HDL:  27.9 (goal: greater than 40)    TC: 99 (goal: less than 200)  The above lab results are reflective of current therapy with atorvastatin 40mg PO daily    Assessment:  Patient's DMT2 is currently near goal based on lastest A1C of 7%(from 8/7/24). However, the average of patient's home glucose readings is above the goal. Patient is currently taking dulaglutide 3mg subcutaneous weekly and metformin 1000mg PO BID. Patient is compliant with meds and denies any ADRs to meds. Patient does report increase in carbohydrate intake since last visit with clinical pharmacist, which could explain uncontrolled home glucose readings.       Plan:   - Reduce carbohydrate intake   - Continue Trulicity 3mg subcutaneous weelky  - Continue metformin 1000mg PO BID      The patient and his ex-sister-in-law verbalized understanding of everything discussed at today's visit and agree with plan formulated by clinical pharmacist    Next visit with clinical pharmacist:  9/23/24 at 4:30 PM      Continue all meds under the continuation of care with the referring provider and clinical pharmacy team.    Patient Education    The patient and his ex-sister-in-law are aware of the following regarding DMT2    - The side effects of the medications patient uses to treat disease state(s)  - The signs and symptoms of hypoglycemia and what to do if it occurs       Jose Luis Hills, PharmD, CPh, BCACP  Clinical Pharmacy  [facility]

## 2024-09-09 NOTE — ASSESSMENT & PLAN NOTE
Assessment:  Patient's DMT2 is currently near goal based on lastest A1C of 7%(from 8/7/24). However, the average of patient's home glucose readings is above the goal. Patient is currently taking dulaglutide 3mg subcutaneous weekly and metformin 1000mg PO BID. Patient is compliant with meds and denies any ADRs to meds. Patient does report increase in carbohydrate intake since last visit with clinical pharmacist, which could explain uncontrolled home glucose readings.       Plan:   - Reduce carbohydrate intake   - Continue Trulicity 3mg subcutaneous weelky  - Continue metformin 1000mg PO BID

## 2024-09-11 DIAGNOSIS — I10 ESSENTIAL (PRIMARY) HYPERTENSION: ICD-10-CM

## 2024-09-11 RX ORDER — CLOPIDOGREL BISULFATE 75 MG/1
75 TABLET ORAL DAILY
Qty: 90 TABLET | Refills: 0 | Status: SHIPPED | OUTPATIENT
Start: 2024-09-11

## 2024-09-12 ENCOUNTER — PHARMACY VISIT (OUTPATIENT)
Dept: PHARMACY | Facility: CLINIC | Age: 74
End: 2024-09-12
Payer: COMMERCIAL

## 2024-09-18 ENCOUNTER — HOSPITAL ENCOUNTER (OUTPATIENT)
Dept: RADIOLOGY | Facility: HOSPITAL | Age: 74
Discharge: HOME | End: 2024-09-18
Payer: COMMERCIAL

## 2024-09-18 DIAGNOSIS — E11.9 TYPE 2 DIABETES MELLITUS WITHOUT COMPLICATION, WITHOUT LONG-TERM CURRENT USE OF INSULIN (MULTI): ICD-10-CM

## 2024-09-18 DIAGNOSIS — I10 BENIGN ESSENTIAL HYPERTENSION: ICD-10-CM

## 2024-09-18 DIAGNOSIS — I48.20 CHRONIC ATRIAL FIBRILLATION (MULTI): ICD-10-CM

## 2024-09-18 PROCEDURE — 75571 CT HRT W/O DYE W/CA TEST: CPT

## 2024-09-23 ENCOUNTER — APPOINTMENT (OUTPATIENT)
Dept: PHARMACY | Facility: HOSPITAL | Age: 74
End: 2024-09-23
Payer: COMMERCIAL

## 2024-09-23 ENCOUNTER — CLINICAL SUPPORT (OUTPATIENT)
Dept: PRIMARY CARE | Facility: CLINIC | Age: 74
End: 2024-09-23
Payer: COMMERCIAL

## 2024-09-23 ENCOUNTER — TELEMEDICINE (OUTPATIENT)
Dept: PHARMACY | Facility: HOSPITAL | Age: 74
End: 2024-09-23
Payer: COMMERCIAL

## 2024-09-23 DIAGNOSIS — Z23 NEED FOR VACCINATION: ICD-10-CM

## 2024-09-23 DIAGNOSIS — E11.9 TYPE 2 DIABETES MELLITUS WITHOUT COMPLICATION, WITHOUT LONG-TERM CURRENT USE OF INSULIN (MULTI): ICD-10-CM

## 2024-09-23 PROCEDURE — G0008 ADMIN INFLUENZA VIRUS VAC: HCPCS | Performed by: NURSE PRACTITIONER

## 2024-09-23 NOTE — PROGRESS NOTES
Primary Care Clinical Pharmacist Follow-up Visit     Subjective   Patient ID: Shaan Dailey is a 73 y.o. male who presents for Diabetes.    Referring Provider: ADILSON Pickett     Date of Follow-up Visit: 9/23/24  Disease state(s) to be managed by clinical pharmacist: DMT2  Referring Provider: ADILSON Pickett (PCP)  Most recent appointment with PCP: 8/14/24  Next appointment with PCP: 12/12/24            Subjective:     Patient is a 72 YO M who presents via telehealth with his ex-sister-in-law to visit with clinical pharmacist for follow-up visit for management of DMT2. Patient gives permission for ex-sister-in-law to be present at visit with clinical pharmacist. Patient was referred to clinical pharmacist for management of disease state(s) by PCP. At last visit with clinical pharmacist on 9/9/24, patient was continued on Trulicity 3mg subcutaneous weekly and metformin 1000mg PO BID. At today's visit, patient reports taking Trulicity 3mg subcutaneous weekly and metformin 1000mg PO BID. Patient reports compliance with meds and denies any ADRs to meds. Of note, patient has 2 doses of 3 mg pens left.      Patient reports eating 3 meals/day  Breakfast: scramble egg with spinach, sausage, potatoes  Lunch: varies  Dinner: salad  Snack: grapes, banana  Drinks: water, milk, coffee with some cream and sugar     **Patient reports has been better about watching diet since last visit with clinical pharmacist**     Exercise: walking half an mile, or work out half an hour to one hour every day     Patient checks glucose level at home 1 time per day.  Below are patient's home glucose readings (all readings morning fasting) since last visit with clinical pharmacist on 9/9/24:     179, 153, 118, 148, 155, 155, 172, 125, 147, 105, 129, 126 (Avg =127 mg/dL, goal:  mg/dL)     Most recent eGFR: greater than 90 (8/19/24)  Most recent vitamin B12 level: 245 (8/19/24)  Most recent Albumin/SCr ratio: 8.3 (8/19/24)  Most  recent AST/ALT: 16/21 (8/19/24)  Most recent diabetic retinopathy exam: last appointment was July, 2024(diabetic eye exam was performed at most recent visit and was negative for diabetic retinopathy in both eyes)  Most recent influenza vaccine: 10/22/20  Most recent pneumococcal vaccine: 10/22/17 (PCV13)     Below are the result's of patient's most recent fasting lipid panel: (8/19/24)     LDL: 30 (goal: less than 55)  TG : 207 (goal: less than 500)         HDL:  27.9 (goal: greater than 40)    TC: 99 (goal: less than 200)  The above lab results are reflective of current therapy with atorvastatin 40mg PO daily    Objective     There were no vitals taken for this visit.     Labs  Lab Results   Component Value Date    BILITOT 1.0 08/19/2024    CALCIUM 10.7 (H) 08/19/2024    CO2 24 08/19/2024     08/19/2024    CREATININE 0.88 08/19/2024    GLUCOSE 151 (H) 08/19/2024    ALKPHOS 42 08/19/2024    K 4.8 08/19/2024    PROT 6.4 08/19/2024     08/19/2024    AST 16 08/19/2024    ALT 21 08/19/2024    BUN 21 08/19/2024    ANIONGAP 15 08/19/2024    ALBUMIN 4.6 08/19/2024    GFRMALE 74 09/27/2023     Lab Results   Component Value Date    TRIG 207 (H) 08/19/2024    CHOL 99 08/19/2024    LDLCALC 30 08/19/2024    HDL 27.9 08/19/2024     Lab Results   Component Value Date    HGBA1C 7.0 (H) 08/07/2024       Current Outpatient Medications on File Prior to Visit   Medication Sig Dispense Refill    amLODIPine (Norvasc) 10 mg tablet TAKE ONE TABLET BY MOUTH EVERY DAY 90 tablet 3    aspirin 325 mg EC tablet Take 1 tablet (325 mg) by mouth once daily.      atorvastatin (Lipitor) 40 mg tablet Take 1 tablet (40 mg) by mouth once daily at bedtime. 90 tablet 0    benazepril (Lotensin) 20 mg tablet Take 1 tablet (20 mg) by mouth once daily. 90 tablet 3    clopidogrel (Plavix) 75 mg tablet Take 1 tablet (75 mg) by mouth once daily. 90 tablet 0    dulaglutide 3 mg/0.5 mL pen injector Inject 3 mg under the skin 1 (one) time per week. 2  mL 2    lancets (2-In-1 Lancet Device) 30 gauge misc Use twice daily to evaluate blood sugar      latanoprost (Xalatan) 0.005 % ophthalmic solution INSTILL 1 DROP INTO BOTH EYES IN THE EVENING      metFORMIN (Glucophage) 500 mg tablet TAKE 2 TABLETS(1000 MG) BY MOUTH TWICE DAILY 368 tablet 2    metoprolol succinate XL (Toprol-XL) 50 mg 24 hr tablet Take 1 tablet (50 mg) by mouth once daily. 90 tablet 3    omega 3-dha-epa-fish oil (Fish OiL) 1,000 mg (120 mg-180 mg) capsule Take 1 capsule (1,000 mg) by mouth once daily.      OneTouch Ultra Test strip Use 1 strip TID for blood glucose monitoring 100 each 6     No current facility-administered medications on file prior to visit.        Assessment/Plan   Problem List Items Addressed This Visit             ICD-10-CM    Diabetes mellitus (Multi) E11.9     Assessment:  Patient's DMT2 is currently near goal based on lastest A1C of 7% (from 8/7/24). The average of patient's BG has been much better since last visit, now within goal of  mg/dL. Patient is currently taking dulaglutide 3mg subcutaneous weekly and metformin 1000mg PO BID. Patient is compliant with meds and denies any ADRs to meds. Patient reports being better with his diet since last visit which has contributed to his control.      Plan:   - Continue Trulicity 3mg subcutaneous weelky  - Continue metformin 1000mg PO BID  -Patient and ex sister-in-law would prefer to meet with clinical pharmacist monthly. Will arrange for follow up in person with Jose Luis in 1 month.           Patsy Flores, ZurdoD  Clinical Ambulatory Care Pharmacist  Ph: [phone]    Continue all meds under the continuation of care with the referring provider and clinical pharmacy team.    Clinical Pharmacist follow up: 10/21/24 @ 4:30 PM in-person or sooner as needed based on clinical intervention  Type of Encounter:  Telephone    Verbal consent to manage patient's drug therapy was obtained from the patient. They were informed they may  decline to participate or withdraw from participation in pharmacy services at any time.

## 2024-09-23 NOTE — ASSESSMENT & PLAN NOTE
Assessment:  Patient's DMT2 is currently near goal based on lastest A1C of 7% (from 8/7/24). The average of patient's BG has been much better since last visit, now within goal of  mg/dL. Patient is currently taking dulaglutide 3mg subcutaneous weekly and metformin 1000mg PO BID. Patient is compliant with meds and denies any ADRs to meds. Patient reports being better with his diet since last visit which has contributed to his control.      Plan:   - Continue Trulicity 3mg subcutaneous weelky  - Continue metformin 1000mg PO BID  -Patient and ex sister-in-law would prefer to meet with clinical pharmacist monthly. Will arrange for follow up in person with Jose Luis in 1 month.

## 2024-10-04 PROCEDURE — RXMED WILLOW AMBULATORY MEDICATION CHARGE

## 2024-10-08 ENCOUNTER — PHARMACY VISIT (OUTPATIENT)
Dept: PHARMACY | Facility: CLINIC | Age: 74
End: 2024-10-08
Payer: COMMERCIAL

## 2024-10-21 ENCOUNTER — APPOINTMENT (OUTPATIENT)
Dept: PHARMACY | Facility: HOSPITAL | Age: 74
End: 2024-10-21
Payer: COMMERCIAL

## 2024-10-21 DIAGNOSIS — E11.9 TYPE 2 DIABETES MELLITUS WITHOUT COMPLICATION, WITHOUT LONG-TERM CURRENT USE OF INSULIN (MULTI): ICD-10-CM

## 2024-10-21 RX ORDER — DULAGLUTIDE 4.5 MG/.5ML
4.5 INJECTION, SOLUTION SUBCUTANEOUS WEEKLY
Qty: 2 ML | Refills: 11 | Status: SHIPPED | OUTPATIENT
Start: 2024-10-21 | End: 2025-10-21

## 2024-10-21 NOTE — ASSESSMENT & PLAN NOTE
Assessment:  Patient's DMT2 is currently near goal based on lastest A1C of 7%(from 8/7/24). However, the average of patient's home glucose readings is above the goal. Patient is currently taking dulaglutide 3mg subcutaneous weekly and metformin 1000mg PO BID. Per ex-sister-in-law, patient is compliant with meds and is tolerating meds at current doses.       Plan:    - Increase Trulicity dose from 3mg subcutaneous weekly to 4.5mg subcutaneous weekly (prescription for Trulicity 4.5mg injection electronically sent to patient's preferred pharmacy)  - Continue metformin 1000mg PO BID  - Instructed ex-sister-in-law to have patient go to lab on 11/7/24 to have hemoglobin A1C level checked

## 2024-10-21 NOTE — PROGRESS NOTES
Primary Care Clinical Pharmacist Follow-up Visit      Date of Follow-up Visit: 10/21/24  Disease state(s) to be managed by clinical pharmacist: DMT2  Referring Provider: ADILSON Pickett (PCP)  Most recent appointment with PCP: 8/14/24  Next appointment with PCP: 12/12/24            **Spoke to patient's ex-sister-in-law for visit (patient has previously given permission for ex-sister-in-law to be present at visit with clinical pharmacist). Ex-sister-in-law gives permission for visit to be conducted via telehealth**    Subjective:    Patient is a 74 YO M who was referred to clinical pharmacist for management of disease state(s) by PCP. At last visit with clinical pharmacist on 9/23/24, patient was continued on Trulicity 3mg subcutaneous weekly and metformin 1000mg PO BID.  At today's visit, patient's ex-sister-in-law reports that patient is currently taking Trulicity 3mg subcutaneous weekly and metformin 1000mg PO BID. Ex-sister-in-law reports that patient has been compliant with meds and is tolerating meds at these doses. Of note, patient has already taken four doses of Trulicity 3mg weekly.    Ex-sister-in-law states that there has been no significant change in patient's diet and physical activity since last visit with clinical pharmacist on 9/23/24.    Objective:    Most recent hemoglobin A1C level: 7% (8/7/24) (goal: less than 7%)    Patient checks glucose level at home 1 time per day.  Below are patient's home glucose readings (all readings morning fasting) since last visit with clinical pharmacist on 9/23/24:    145, 152, 140, 160, 163, 154, 121, 157, 142, 129, 141, 163, 134, 168, 172, 173, 161, 160, 150 (Avg =152 mg/dL, goal:  mg/dL)    Most recent eGFR: greater than 90 (8/19/24)  Most recent vitamin B12 level: 245 (8/19/24)  Most recent Albumin/SCr ratio: 8.3 (8/19/24)  Most recent AST/ALT: 16/21 (8/19/24)  Most recent diabetic retinopathy exam: last appointment  was July, 2024(diabetic eye exam was performed at most recent visit and was negative for diabetic retinopathy in both eyes)  Most recent influenza vaccine: 10/22/20  Most recent pneumococcal vaccine: 10/22/17 (PCV13)    Below are the result's of patient's most recent fasting lipid panel: (8/19/24)    LDL: 30 (goal: less than 55)  TG : 207 (goal: less than 500)         HDL:  27.9 (goal: greater than 40)    TC: 99 (goal: less than 200)  The above lab results are reflective of current therapy with atorvastatin 40mg PO daily    Assessment:  Patient's DMT2 is currently near goal based on lastest A1C of 7%(from 8/7/24). However, the average of patient's home glucose readings is above the goal. Patient is currently taking dulaglutide 3mg subcutaneous weekly and metformin 1000mg PO BID. Per ex-sister-in-law, patient is compliant with meds and is tolerating meds at current doses.       Plan:    - Increase Trulicity dose from 3mg subcutaneous weekly to 4.5mg subcutaneous weekly (prescription for Trulicity 4.5mg injection electronically sent to patient's preferred pharmacy)  - Continue metformin 1000mg PO BID  - Instructed ex-sister-in-law to have patient go to lab on 11/7/24 to have hemoglobin A1C level checked    Patient's ex-sister-in-law verbalized understanding of everything discussed at today's visit and agrees with plan formulated by clinical pharmacist    Next visit with clinical pharmacist:  11/25/24 at 9 AM via telehealth      Continue all meds under the continuation of care with the referring provider and clinical pharmacy team.    Patient Education    The patient's ex-sister-in-law is aware of the following regarding DMT2    - The side effects of the medications patient uses to treat disease state(s)  - The signs and symptoms of hypoglycemia and what to do if it occurs       Jose Luis Hills, PharmD, CPh, BCACP  Clinical Pharmacy Specialist  [facility]

## 2024-11-01 PROCEDURE — RXMED WILLOW AMBULATORY MEDICATION CHARGE

## 2024-11-04 ENCOUNTER — PHARMACY VISIT (OUTPATIENT)
Dept: PHARMACY | Facility: CLINIC | Age: 74
End: 2024-11-04
Payer: COMMERCIAL

## 2024-11-11 ENCOUNTER — LAB (OUTPATIENT)
Dept: LAB | Facility: LAB | Age: 74
End: 2024-11-11
Payer: COMMERCIAL

## 2024-11-11 DIAGNOSIS — E11.9 TYPE 2 DIABETES MELLITUS WITHOUT COMPLICATION, WITHOUT LONG-TERM CURRENT USE OF INSULIN (MULTI): ICD-10-CM

## 2024-11-11 LAB
EST. AVERAGE GLUCOSE BLD GHB EST-MCNC: 169 MG/DL
HBA1C MFR BLD: 7.5 %

## 2024-11-11 PROCEDURE — 83036 HEMOGLOBIN GLYCOSYLATED A1C: CPT

## 2024-11-11 PROCEDURE — 36415 COLL VENOUS BLD VENIPUNCTURE: CPT

## 2024-11-24 DIAGNOSIS — I10 ESSENTIAL (PRIMARY) HYPERTENSION: ICD-10-CM

## 2024-11-25 ENCOUNTER — APPOINTMENT (OUTPATIENT)
Dept: PHARMACY | Facility: HOSPITAL | Age: 74
End: 2024-11-25
Payer: COMMERCIAL

## 2024-11-25 DIAGNOSIS — E11.9 TYPE 2 DIABETES MELLITUS WITHOUT COMPLICATION, WITHOUT LONG-TERM CURRENT USE OF INSULIN (MULTI): ICD-10-CM

## 2024-11-25 PROCEDURE — RXMED WILLOW AMBULATORY MEDICATION CHARGE

## 2024-11-25 RX ORDER — CLOPIDOGREL BISULFATE 75 MG/1
75 TABLET ORAL DAILY
Qty: 90 TABLET | Refills: 0 | Status: SHIPPED | OUTPATIENT
Start: 2024-11-25

## 2024-11-25 NOTE — ASSESSMENT & PLAN NOTE
Assessment:  Patient's DMT2 is currently uncontrolled based on lastest A1C of 7.5% (from 11/11/24). The average of patient's home glucose readings is also above the goal. Patient is currently taking dulaglutide 4.5mg subcutaneous weekly and metformin 1000mg PO BID. Per ex-sister-in-law, patient is compliant with meds and is tolerating meds at current doses. Of note, patient has taken three doses of Trulicity 4.5mg weekly.     Plan:    - Continue Trulicity 4.5mg subcutaneous weekly  - Continue metformin 1000mg PO BID  - Continue checking glucose  level at home daily and have readings available at next visit with clinical pharmacist

## 2024-11-25 NOTE — PROGRESS NOTES
Primary Care Clinical Pharmacist Follow-up Visit      Date of Follow-up Visit: 11/25/24  Disease state(s) to be managed by clinical pharmacist: DMT2  Referring Provider: ADILSON Pickett (PCP)  Most recent appointment with PCP: 8/14/24  Next appointment with PCP: 12/12/24            **Spoke to patient's ex-sister-in-law, Jessica Hendrix, for visit (patient has previously given permission for ex-sister-in-law to be present at visit with clinical pharmacist). Ex-sister-in-law gives permission for visit to be conducted via telehealth**    Subjective:    Patient is a 73 YO M who was referred to clinical pharmacist for management of disease state(s) by PCP. At last visit with clinical pharmacist on 10/21/24, Trulicity dose was increased from 3mg subcutaneous weekly to 4.5mg subcutaneous weekly, and patient was continued on metformin 1000mg PO BID. At today's visit, patient's ex-sister-in-law reports that patient is currently taking Trulicity 4.5mg subcutaneous weekly and metformin 1000mg PO BID. Ex-sister-in-law reports that patient has been compliant with meds and is tolerating meds at these doses. Of note, patient has already taken three doses of Trulicity 4.5mg weekly.    Ex-sister-in-law states that there has been no significant change in patient's diet and physical activity since last visit with clinical pharmacist on 10/21/24.    Objective:    Most recent hemoglobin A1C level: 7.5% (11/11/24) (goal: less than 7%)    Patient checks glucose level at home 1 time per day.  Below are patient's home glucose readings (all readings morning fasting) since 11/9/24:    153, 156, 162, 156, 169, 155, 151, 149, 154, 157, 150, 142, 157, 148, 153, 151 (Avg =154 mg/dL, goal:  mg/dL)    Most recent eGFR: greater than 90 (8/19/24)  Most recent vitamin B12 level: 245 (8/19/24)  Most recent Albumin/SCr ratio: 8.3 (8/19/24)  Most recent AST/ALT: 16/21 (8/19/24)  Most recent diabetic retinopathy  exam: last appointment was July, 2024(diabetic eye exam was performed at most recent visit and was negative for diabetic retinopathy in both eyes)  Most recent influenza vaccine: 9/23/24  Most recent pneumococcal vaccine: 10/22/17 (PCV13)    Below are the result's of patient's most recent fasting lipid panel: (8/19/24)    LDL: 30 (goal: less than 55)  TG : 207 (goal: less than 500)         HDL:  27.9 (goal: greater than 40)    TC: 99 (goal: less than 200)  The above lab results are reflective of current therapy with atorvastatin 40mg PO daily    Assessment:  Patient's DMT2 is currently uncontrolled based on lastest A1C of 7.5% (from 11/11/24). The average of patient's home glucose readings is also above the goal. Patient is currently taking dulaglutide 4.5mg subcutaneous weekly and metformin 1000mg PO BID. Per ex-sister-in-law, patient is compliant with meds and is tolerating meds at current doses. Of note, patient has taken three doses of Trulicity 4.5mg weekly.     Plan:    - Continue Trulicity 4.5mg subcutaneous weekly  - Continue metformin 1000mg PO BID  - Continue checking glucose  level at home daily and have readings available at next visit with clinical pharmacist    Patient's ex-sister-in-law verbalized understanding of everything discussed at today's visit and agrees with plan formulated by clinical pharmacist    Next visit with clinical pharmacist:  12/9/24 at 10 AM via telehealth      Continue all meds under the continuation of care with the referring provider and clinical pharmacy team.    Patient Education    The patient's ex-sister-in-law is aware of the following regarding DMT2    - The side effects of the medications patient uses to treat disease state(s)  - The signs and symptoms of hypoglycemia and what to do if it occurs       Jose Luis Hills, PharmD, CPh, BCACP  Clinical Pharmacy Specialist  [facility]

## 2024-11-29 ENCOUNTER — PHARMACY VISIT (OUTPATIENT)
Dept: PHARMACY | Facility: CLINIC | Age: 74
End: 2024-11-29
Payer: COMMERCIAL

## 2024-12-09 ENCOUNTER — APPOINTMENT (OUTPATIENT)
Dept: PHARMACY | Facility: HOSPITAL | Age: 74
End: 2024-12-09
Payer: COMMERCIAL

## 2024-12-09 DIAGNOSIS — E11.69 TYPE 2 DIABETES MELLITUS WITH OTHER SPECIFIED COMPLICATION, WITH LONG-TERM CURRENT USE OF INSULIN: ICD-10-CM

## 2024-12-09 DIAGNOSIS — E11.9 TYPE 2 DIABETES MELLITUS WITHOUT COMPLICATION, WITHOUT LONG-TERM CURRENT USE OF INSULIN (MULTI): ICD-10-CM

## 2024-12-09 DIAGNOSIS — Z79.4 TYPE 2 DIABETES MELLITUS WITH OTHER SPECIFIED COMPLICATION, WITH LONG-TERM CURRENT USE OF INSULIN: ICD-10-CM

## 2024-12-09 RX ORDER — METFORMIN HYDROCHLORIDE 500 MG/1
1000 TABLET ORAL 2 TIMES DAILY
Qty: 360 TABLET | Refills: 3 | Status: SHIPPED | OUTPATIENT
Start: 2024-12-09

## 2024-12-09 NOTE — Clinical Note
Zac Mendoza,      Patient's DMT2 is still uncontrolled with patient taking maximum effective doses of both Trulicity and metformin, and it does not seem that there is room for improvement in patient's diet. Patient's next appointment with you is in three days on 12/12/24. Recommend having a discussion with patient about improving glycemic control at that visit. An SGLT-2 inhibitor such as Jardiance (starting at 10mg PO daily) would probably be the best antidiabetic medication to take in addition to Trulicity and metformin if you feel that patient would benefit from taking an additional antidiabetic medication. Thank you.

## 2024-12-09 NOTE — ASSESSMENT & PLAN NOTE
Assessment:  Patient's DMT2 is currently uncontrolled based on lastest A1C of 7.5% (from 11/11/24). The average of patient's home glucose readings is also above the goal. Patient is currently taking dulaglutide 4.5mg subcutaneous weekly and metformin 1000mg PO BID. Per ex-sister-in-law, patient is compliant with meds and is tolerating meds at current doses. Of note, patient has upcoming PCP appointment in three days.     Plan:    - Continue Trulicity 4.5mg subcutaneous weekly  - Continue metformin 1000mg PO BID  - Will reach out to patient's PCP regarding possible next steps to improve patient's glycemic control  - Continue checking glucose  level at home daily and have readings available at next visit with clinical pharmacist

## 2024-12-09 NOTE — PROGRESS NOTES
Primary Care Clinical Pharmacist Follow-up Visit      Date of Follow-up Visit: 12/9/24  Disease state(s) to be managed by clinical pharmacist: DMT2  Referring Provider: ADILSON Pickett (PCP)  Most recent appointment with PCP: 8/14/24  Next appointment with PCP: 12/12/24            **Spoke to patient's ex-sister-in-law, Jessica Hendrix, for visit (patient has previously given permission for ex-sister-in-law to be present at visit with clinical pharmacist). Ex-sister-in-law gives permission for visit to be conducted via telehealth**    Subjective:    Patient is a 75 YO M who was referred to clinical pharmacist for management of disease state(s) by PCP. At last visit with clinical pharmacist on 11/25/24, patient was continued on Trulicity 4.5mg subcutaneous weekly and metformin 1000mg PO BID.  At today's visit, patient's ex-sister-in-law reports that patient is currently taking Trulicity 4.5mg subcutaneous weekly and metformin 1000mg PO BID. Ex-sister-in-law reports that patient has been compliant with meds and is tolerating meds at these doses.     Ex-sister-in-law states that there has been no significant change in patient's diet and physical activity since last visit with clinical pharmacist on 11/25/24.    Objective:    Most recent hemoglobin A1C level: 7.5% (11/11/24) (goal: less than 7%)    Patient checks glucose level at home 1 time per day.  Below are patient's home glucose readings (all readings morning fasting) since 11/26/24:    116, 168, 162, 159, 135, 158, 151, 144, 140, 147, 149, 157, 161   (Avg = 150 mg/dL, goal:  mg/dL)    Most recent eGFR: greater than 90 (8/19/24)  Most recent vitamin B12 level: 245 (8/19/24)  Most recent Albumin/SCr ratio: 8.3 (8/19/24)  Most recent AST/ALT: 16/21 (8/19/24)  Most recent diabetic retinopathy exam: last appointment was July, 2024(diabetic eye exam was performed at most recent visit and was negative for diabetic retinopathy in  both eyes)  Most recent influenza vaccine: 9/23/24  Most recent pneumococcal vaccine: 10/22/17 (PCV13)    Below are the result's of patient's most recent fasting lipid panel: (8/19/24)    LDL: 30 (goal: less than 55)  TG : 207 (goal: less than 500)         HDL:  27.9 (goal: greater than 40)    TC: 99 (goal: less than 200)  The above lab results are reflective of current therapy with atorvastatin 40mg PO daily    Assessment:  Patient's DMT2 is currently uncontrolled based on lastest A1C of 7.5% (from 11/11/24). The average of patient's home glucose readings is also above the goal. Patient is currently taking dulaglutide 4.5mg subcutaneous weekly and metformin 1000mg PO BID. Per ex-sister-in-law, patient is compliant with meds and is tolerating meds at current doses. Of note, patient has upcoming PCP appointment in three days.     Plan:    - Continue Trulicity 4.5mg subcutaneous weekly  - Continue metformin 1000mg PO BID  - Will reach out to patient's PCP regarding possible next steps to improve patient's glycemic control  - Continue checking glucose  level at home daily and have readings available at next visit with clinical pharmacist    Patient's ex-sister-in-law verbalized understanding of everything discussed at today's visit and agrees with plan formulated by clinical pharmacist    Next visit with clinical pharmacist:  1/6/25 at 10 AM via telehealth      Continue all meds under the continuation of care with the referring provider and clinical pharmacy team.    Patient Education    The patient's ex-sister-in-law is aware of the following regarding DMT2    - The side effects of the medications patient uses to treat disease state(s)  - The signs and symptoms of hypoglycemia and what to do if it occurs       Jose Luis Hills, PharmD, CPh, BCACP  Clinical Pharmacy Specialist  [facility]

## 2024-12-12 ENCOUNTER — OFFICE VISIT (OUTPATIENT)
Dept: PRIMARY CARE | Facility: CLINIC | Age: 74
End: 2024-12-12
Payer: COMMERCIAL

## 2024-12-12 VITALS
HEART RATE: 82 BPM | TEMPERATURE: 97.3 F | DIASTOLIC BLOOD PRESSURE: 75 MMHG | OXYGEN SATURATION: 99 % | RESPIRATION RATE: 16 BRPM | WEIGHT: 208 LBS | BODY MASS INDEX: 33.43 KG/M2 | HEIGHT: 66 IN | SYSTOLIC BLOOD PRESSURE: 130 MMHG

## 2024-12-12 DIAGNOSIS — Z00.00 ROUTINE GENERAL MEDICAL EXAMINATION AT HEALTH CARE FACILITY: Primary | ICD-10-CM

## 2024-12-12 DIAGNOSIS — I10 ESSENTIAL (PRIMARY) HYPERTENSION: ICD-10-CM

## 2024-12-12 DIAGNOSIS — I10 PRIMARY HYPERTENSION: ICD-10-CM

## 2024-12-12 DIAGNOSIS — E11.69 HYPERLIPIDEMIA ASSOCIATED WITH TYPE 2 DIABETES MELLITUS (MULTI): ICD-10-CM

## 2024-12-12 DIAGNOSIS — E78.5 HYPERLIPIDEMIA ASSOCIATED WITH TYPE 2 DIABETES MELLITUS (MULTI): ICD-10-CM

## 2024-12-12 DIAGNOSIS — E11.9 TYPE 2 DIABETES MELLITUS WITHOUT COMPLICATION, WITHOUT LONG-TERM CURRENT USE OF INSULIN (MULTI): ICD-10-CM

## 2024-12-12 PROCEDURE — G0439 PPPS, SUBSEQ VISIT: HCPCS | Performed by: NURSE PRACTITIONER

## 2024-12-12 PROCEDURE — 3048F LDL-C <100 MG/DL: CPT | Performed by: NURSE PRACTITIONER

## 2024-12-12 PROCEDURE — RXMED WILLOW AMBULATORY MEDICATION CHARGE

## 2024-12-12 PROCEDURE — 99215 OFFICE O/P EST HI 40 MIN: CPT | Mod: 25 | Performed by: NURSE PRACTITIONER

## 2024-12-12 PROCEDURE — 1126F AMNT PAIN NOTED NONE PRSNT: CPT | Performed by: NURSE PRACTITIONER

## 2024-12-12 PROCEDURE — 1160F RVW MEDS BY RX/DR IN RCRD: CPT | Performed by: NURSE PRACTITIONER

## 2024-12-12 PROCEDURE — 3078F DIAST BP <80 MM HG: CPT | Performed by: NURSE PRACTITIONER

## 2024-12-12 PROCEDURE — 3051F HG A1C>EQUAL 7.0%<8.0%: CPT | Performed by: NURSE PRACTITIONER

## 2024-12-12 PROCEDURE — 1159F MED LIST DOCD IN RCRD: CPT | Performed by: NURSE PRACTITIONER

## 2024-12-12 PROCEDURE — 1170F FXNL STATUS ASSESSED: CPT | Performed by: NURSE PRACTITIONER

## 2024-12-12 PROCEDURE — 3075F SYST BP GE 130 - 139MM HG: CPT | Performed by: NURSE PRACTITIONER

## 2024-12-12 PROCEDURE — 3061F NEG MICROALBUMINURIA REV: CPT | Performed by: NURSE PRACTITIONER

## 2024-12-12 PROCEDURE — 4010F ACE/ARB THERAPY RXD/TAKEN: CPT | Performed by: NURSE PRACTITIONER

## 2024-12-12 PROCEDURE — 1036F TOBACCO NON-USER: CPT | Performed by: NURSE PRACTITIONER

## 2024-12-12 PROCEDURE — 3008F BODY MASS INDEX DOCD: CPT | Performed by: NURSE PRACTITIONER

## 2024-12-12 RX ORDER — DAPAGLIFLOZIN 10 MG/1
10 TABLET, FILM COATED ORAL DAILY
Qty: 90 TABLET | Refills: 3 | Status: SHIPPED | OUTPATIENT
Start: 2024-12-12 | End: 2026-01-16

## 2024-12-12 RX ORDER — BLOOD-GLUCOSE,RECEIVER,CONT
EACH MISCELLANEOUS
Qty: 1 EACH | Refills: 0 | Status: SHIPPED | OUTPATIENT
Start: 2024-12-12

## 2024-12-12 RX ORDER — BLOOD-GLUCOSE SENSOR
1 EACH MISCELLANEOUS CONTINUOUS
Qty: 3 EACH | Refills: 11 | Status: SHIPPED | OUTPATIENT
Start: 2024-12-12 | End: 2024-12-12

## 2024-12-12 RX ORDER — ATORVASTATIN CALCIUM 40 MG/1
40 TABLET, FILM COATED ORAL NIGHTLY
Qty: 90 TABLET | Refills: 0 | Status: SHIPPED | OUTPATIENT
Start: 2024-12-12

## 2024-12-12 RX ORDER — DAPAGLIFLOZIN 10 MG/1
10 TABLET, FILM COATED ORAL DAILY
Qty: 100 TABLET | Refills: 3 | Status: SHIPPED | OUTPATIENT
Start: 2024-12-12 | End: 2024-12-12 | Stop reason: SDUPTHER

## 2024-12-12 RX ORDER — BLOOD-GLUCOSE SENSOR
1 EACH MISCELLANEOUS CONTINUOUS
Qty: 3 EACH | Refills: 11 | Status: SHIPPED | OUTPATIENT
Start: 2024-12-12

## 2024-12-12 RX ORDER — BLOOD-GLUCOSE,RECEIVER,CONT
EACH MISCELLANEOUS
Qty: 1 EACH | Refills: 0 | Status: SHIPPED | OUTPATIENT
Start: 2024-12-12 | End: 2024-12-12

## 2024-12-12 RX ORDER — METOPROLOL SUCCINATE 50 MG/1
50 TABLET, EXTENDED RELEASE ORAL DAILY
Qty: 90 TABLET | Refills: 3 | Status: SHIPPED | OUTPATIENT
Start: 2024-12-12

## 2024-12-12 ASSESSMENT — ACTIVITIES OF DAILY LIVING (ADL)
TAKING_MEDICATION: INDEPENDENT
DOING_HOUSEWORK: INDEPENDENT
GROCERY_SHOPPING: INDEPENDENT
BATHING: INDEPENDENT
DRESSING: INDEPENDENT
MANAGING_FINANCES: NEEDS ASSISTANCE

## 2024-12-12 ASSESSMENT — PAIN SCALES - GENERAL
PAINLEVEL_OUTOF10: 0-NO PAIN
PAINLEVEL_OUTOF10: 0-NO PAIN

## 2024-12-12 ASSESSMENT — PATIENT HEALTH QUESTIONNAIRE - PHQ9
1. LITTLE INTEREST OR PLEASURE IN DOING THINGS: NOT AT ALL
2. FEELING DOWN, DEPRESSED OR HOPELESS: NOT AT ALL
SUM OF ALL RESPONSES TO PHQ9 QUESTIONS 1 AND 2: 0

## 2024-12-12 ASSESSMENT — ENCOUNTER SYMPTOMS
DEPRESSION: 0
OCCASIONAL FEELINGS OF UNSTEADINESS: 0
LOSS OF SENSATION IN FEET: 0

## 2024-12-12 NOTE — ASSESSMENT & PLAN NOTE
Orders:    blood-glucose sensor (Dexcom G7 Sensor) device; 1 each continuously.    Dexcom G7  misc; Use as instructed

## 2024-12-12 NOTE — PATIENT INSTRUCTIONS
Thank you for coming in for your visit today!    Please follow up in 4 weeks for virtual follow up and at the end of February for next A1C check.     We will order a continuous glucometer for you.  RESTART Farxiga.  Continue metformin, with goal of discontinuation to reduce polypharmacy.   Start walking (somewhere safe, such as [employer])  Continue to self monitor your blood sugar and take your medications, as directed.   Decrease refined sugars and carbohydrates in your diet.   DO NOT SKIP MEALS! It is important for your blood sugar to have small healthy meals throughout the day.  Make sure to keep a snack on your person at all times, if needed, for low blood sugar.  Exercise for 30 minutes daily.    Drink adequate liquids before, during, and after exercise to prevent dehydration, which can alter blood sugar levels.      Call 911 or go to the emergency room if you have pain in your chest, difficulty breathing, or other life threatening symptoms.        Ways to Help Prevent Falls at Home    Quick Tips   ? Ask for help if you need it. Most people want to help!   ? Get up slowly after sitting or laying down   ? Wear a medical alert device or keep cell phone in your pocket   ? Use night lights, especially areas near a bathroom   ? Keep the items you use often within reach on a small stool or end table   ? Use an assistive device such as walker or cane, as directed by provider/physical therapy   ? Use a non-slip mat and grab bars in your bathroom. Look for home health sections for best options     Other Areas to Focus On   ? Exercise and nutrition: Regular exercise or taking a falls prevention class are great ways improve strength and balance. Don’t forget to stay hydrated and bring a snack!   ? Medicine side effects: Some medicines can make you sleepy or dizzy, which could cause a fall. Ask your healthcare provider about the side effects your medicines could cause. Be sure to let them know if you take any vitamins or  supplements as well.   ? Tripping hazards: Remove items you could trip on, such as loose mats, rugs, cords, and clutter. Wear closed toe shoes with rubber soles.   ? Health and wellness: Get regular checkups with your healthcare provider, plus routine vision and hearing screenings. Talk with your healthcare provider about:   o Your medicines and the possible side effects - bring them in a bag if that is easier!   o Problems with balance or feeling dizzy   o Ways to promote bone health, such as Vitamin D and calcium supplements   o Questions or concerns about falling     *Ask your healthcare team if you have questions     ©[facility], 2022

## 2024-12-12 NOTE — PROGRESS NOTES
"Subjective   Reason for Visit: Shaan Dailey is an 74 y.o. male here for a Medicare Wellness visit.          Reviewed all medications by prescribing practitioner or clinical pharmacist (such as prescriptions, OTCs, herbal therapies and supplements) and documented in the medical record.    [facility]    Patient Care Team:  ADILSON Pickett as PCP - General (Family Medicine)  ADILSON Pickett as PCP - [employer] PCP  Jose Luis Hills, PharmD as Pharmacist (Pharmacy)     Mr. Dailey is a 73 yo M here today for follow up   Recent A1C value of 7.5%. Acceptable for otherwise healthy older adult/ age range.   He is currently on maximum dose of Trulicity. He reports tolerating the medication without issue.   Patient expresses goal of reducing dose of Trulicity into the future. He does not believe his dietary habits have changed drastically. He would like to increase exercise, may reach out to the gym/school across the street from him to see if he could walk in the building during the winter months. He is very interested in the potential for CGM monitoring, notes discomfort on fingertips. Will send through DME. Denies polydipsia, polyuria, headache, blurred vision, or lightheadedness  He notes feeling better in the last few months after receiving Trulicity back. He would like to continue to optimize medication regimen.   He is open to restarting Farxiga. Continues metformin at this time.     Has upcoming appointment with cardiology (12/16). Sees a private practice provider in [facility]. He will bring recent cardiac scoring results to appointment.     UTD with eye exam (October)    Review of Systems    Objective   Vitals:  /75   Pulse 82   Temp 36.3 °C (97.3 °F)   Resp 16   Ht 1.676 m (5' 6\")   Wt 94.3 kg (208 lb)   SpO2 99%   BMI 33.57 kg/m²       Physical Exam  Constitutional:       Appearance: Normal appearance.   Cardiovascular:      Rate and Rhythm: Normal rate and regular rhythm.   Pulmonary: "      Effort: Pulmonary effort is normal. No respiratory distress.      Breath sounds: Normal breath sounds.   Skin:     General: Skin is warm and dry.   Neurological:      Mental Status: He is oriented to person, place, and time.   Psychiatric:         Mood and Affect: Mood normal.         Assessment & Plan  Routine general medical examination at health care facility    Orders:    1 Year Follow Up In Primary Care - Wellness Exam; Future    Primary hypertension    Orders:    metoprolol succinate XL (Toprol-XL) 50 mg 24 hr tablet; Take 1 tablet (50 mg) by mouth once daily.    Essential (primary) hypertension    Orders:    atorvastatin (Lipitor) 40 mg tablet; Take 1 tablet (40 mg) by mouth once daily at bedtime.    Hyperlipidemia associated with type 2 diabetes mellitus (Multi)    Orders:    atorvastatin (Lipitor) 40 mg tablet; Take 1 tablet (40 mg) by mouth once daily at bedtime.    Type 2 diabetes mellitus without complication, without long-term current use of insulin (Multi)    Orders:    blood-glucose sensor (Dexcom G7 Sensor) device; 1 each continuously.    Dexcom G7  misc; Use as instructed            Patient was identified as a fall risk. Risk prevention instructions provided.

## 2024-12-16 ENCOUNTER — PHARMACY VISIT (OUTPATIENT)
Dept: PHARMACY | Facility: CLINIC | Age: 74
End: 2024-12-16
Payer: COMMERCIAL

## 2024-12-23 PROCEDURE — RXMED WILLOW AMBULATORY MEDICATION CHARGE

## 2024-12-24 ENCOUNTER — PHARMACY VISIT (OUTPATIENT)
Dept: PHARMACY | Facility: CLINIC | Age: 74
End: 2024-12-24
Payer: COMMERCIAL

## 2025-01-06 ENCOUNTER — APPOINTMENT (OUTPATIENT)
Dept: PHARMACY | Facility: HOSPITAL | Age: 75
End: 2025-01-06
Payer: COMMERCIAL

## 2025-01-06 DIAGNOSIS — Z79.4 TYPE 2 DIABETES MELLITUS WITH OTHER SPECIFIED COMPLICATION, WITH LONG-TERM CURRENT USE OF INSULIN: ICD-10-CM

## 2025-01-06 DIAGNOSIS — E11.69 TYPE 2 DIABETES MELLITUS WITH OTHER SPECIFIED COMPLICATION, WITH LONG-TERM CURRENT USE OF INSULIN: ICD-10-CM

## 2025-01-06 NOTE — ASSESSMENT & PLAN NOTE
Assessment:  Patient's latest hemoglobin A1C level of 7.5% from 11/11/24 is acceptable , per patient's PCP. Patient is currently taking dulaglutide 4.5mg subcutaneous weekly, metformin 1000mg PO BID, and Farxiga 10mg PO daily. Per ex-sister-in-law, patient is compliant with meds and is tolerating meds at current doses. The average of patient's home glucose readings is 154 mg/dL.      Plan:    - Will confirm patient's hemoglobin A1C goal and goal for average of morning fasting glucose with patient's PCP  - Continue Trulicity 4.5mg subcutaneous weekly  - Continue metformin 1000mg PO BID  - Continue Farxiga 10mg PO daily  - Continue checking glucose  level at home daily and have readings available at next visit with clinical pharmacist

## 2025-01-06 NOTE — PROGRESS NOTES
Primary Care Clinical Pharmacist Follow-up Visit      Date of Follow-up Visit: 1/6/25  Disease state(s) to be managed by clinical pharmacist: DMT2  Referring Provider: ADILSON Pickett (PCP)  Most recent appointment with PCP: 12/12/24  Next appointment with PCP: 1/15/25            **Spoke to patient's ex-sister-in-law, Jessica Hendrix, for visit (patient has previously given permission for ex-sister-in-law to be present at visit with clinical pharmacist). Ex-sister-in-law gives permission for visit to be conducted via telehealth**    Subjective:    Patient is a 75 YO M who was referred to clinical pharmacist for management of disease state(s) by PCP. At last visit with clinical pharmacist on 12/9/24, patient was continued on Trulicity 4.5mg subcutaneous weekly and metformin 1000mg PO BID. At most recent PCP visit on 12/12/24, patient was prescribed Farxiga 10mg PO daily. At today's visit, patient's ex-sister-in-law reports that patient is currently taking Trulicity 4.5mg subcutaneous weekly, metformin 1000mg PO BID, and Farxiga 10mg PO daily (started taking med about 2.5 weeks ago). Ex-sister-in-law reports that patient has been compliant with meds and is tolerating meds at these doses.     Ex-sister-in-law states that there has been no significant change in patient's diet and physical activity since last visit with clinical pharmacist on 12/9/24.    Objective:    Most recent hemoglobin A1C level: 7.5% (11/11/24)     Patient checks glucose level at home 1 time per day.  Below are patient's home glucose readings (all readings morning fasting) since 12/23/24:    133, 149, 167, 156, 152, 150, 137, 169, 159, 160, 158, 155, 153   (Avg = 154 mg/dL)    Most recent eGFR: greater than 90 (8/19/24)  Most recent vitamin B12 level: 245 (8/19/24)  Most recent Albumin/SCr ratio: 8.3 (8/19/24)  Most recent AST/ALT: 16/21 (8/19/24)  Most recent diabetic retinopathy exam: last appointment was July,  2024(diabetic eye exam was performed at most recent visit and was negative for diabetic retinopathy in both eyes)  Most recent influenza vaccine: 9/23/24  Most recent pneumococcal vaccine: 10/22/17 (PCV13)    Below are the result's of patient's most recent fasting lipid panel: (8/19/24)    LDL: 30 (goal: less than 55)  TG : 207 (goal: less than 500)         HDL:  27.9 (goal: greater than 40)    TC: 99 (goal: less than 200)  The above lab results are reflective of current therapy with atorvastatin 40mg PO daily    Assessment:  Patient's latest hemoglobin A1C level of 7.5% from 11/11/24 is acceptable , per patient's PCP. Patient is currently taking dulaglutide 4.5mg subcutaneous weekly, metformin 1000mg PO BID, and Farxiga 10mg PO daily. Per ex-sister-in-law, patient is compliant with meds and is tolerating meds at current doses. The average of patient's home glucose readings is 154 mg/dL.      Plan:    - Will confirm patient's hemoglobin A1C goal and goal for average of morning fasting glucose with patient's PCP  - Continue Trulicity 4.5mg subcutaneous weekly  - Continue metformin 1000mg PO BID  - Continue Farxiga 10mg PO daily  - Continue checking glucose  level at home daily and have readings available at next visit with clinical pharmacist    Patient's ex-sister-in-law verbalized understanding of everything discussed at today's visit and agrees with plan formulated by clinical pharmacist    Next visit with clinical pharmacist:  1/27/25 at 10 AM via telehealth      Continue all meds under the continuation of care with the referring provider and clinical pharmacy team.    Patient Education    The patient's ex-sister-in-law is aware of the following regarding DMT2    - The side effects of the medications patient uses to treat disease state(s)  - The signs and symptoms of hypoglycemia and what to do if it occurs       Jose Luis Hills, PharmD, CPh, BCACP  Clinical Pharmacy Specialist  [facility]

## 2025-01-06 NOTE — Clinical Note
Zac Mendoza,      In your note from patient's last visit with you, you mentioned that patient's hemoglobin A1C level of 7.5% was acceptable. Based on this, is patient's hemoglobin A1C goal less than 8%? If so, what would be the goal for the average of patient's home fasting glucose? Please let me know. Thank you.

## 2025-01-15 ENCOUNTER — TELEMEDICINE (OUTPATIENT)
Dept: PRIMARY CARE | Facility: CLINIC | Age: 75
End: 2025-01-15
Payer: COMMERCIAL

## 2025-01-15 DIAGNOSIS — E11.9 TYPE 2 DIABETES MELLITUS WITHOUT COMPLICATION, WITHOUT LONG-TERM CURRENT USE OF INSULIN (MULTI): Primary | ICD-10-CM

## 2025-01-15 PROCEDURE — RXMED WILLOW AMBULATORY MEDICATION CHARGE

## 2025-01-15 PROCEDURE — 99213 OFFICE O/P EST LOW 20 MIN: CPT | Performed by: NURSE PRACTITIONER

## 2025-01-15 PROCEDURE — G2211 COMPLEX E/M VISIT ADD ON: HCPCS | Performed by: NURSE PRACTITIONER

## 2025-01-15 PROCEDURE — 4010F ACE/ARB THERAPY RXD/TAKEN: CPT | Performed by: NURSE PRACTITIONER

## 2025-01-15 NOTE — PATIENT INSTRUCTIONS
Thank you for coming in for your visit today!    Please follow up in     Next A1C check 2/11    Call 911 or go to the emergency room if you have pain in your chest, difficulty breathing, or other life threatening symptoms.

## 2025-01-15 NOTE — PROGRESS NOTES
"Subjective   Shaan Dailey is a 74 y.o. male who presents for No chief complaint on file..  HPI    \"I feel great\"   Notes that his highest blood sugars are 140/150   He is taking all of his medications.   He is doing some walking     Fell yesterday, [place] of [place] and landed on his back and his head. He notes that he had a little cut on the top of his head. He denies dizziness, loss of conciousness, nausea  He notes that he was able to get right up after he fell.   Sleeping well,     Notes that he will check his blood sugar before bed and its been very good.   All systems reviewed. Review of systems negative except for noted positives in HPI    Objective     There were no vitals taken for this visit.   Vital signs noted and reviewed.       Physical Exam        Assessment/Plan   Problem List Items Addressed This Visit    None              "

## 2025-01-17 ENCOUNTER — PHARMACY VISIT (OUTPATIENT)
Dept: PHARMACY | Facility: CLINIC | Age: 75
End: 2025-01-17
Payer: COMMERCIAL

## 2025-01-27 ENCOUNTER — APPOINTMENT (OUTPATIENT)
Dept: PHARMACY | Facility: HOSPITAL | Age: 75
End: 2025-01-27
Payer: COMMERCIAL

## 2025-01-27 DIAGNOSIS — E11.69 TYPE 2 DIABETES MELLITUS WITH OTHER SPECIFIED COMPLICATION, WITH LONG-TERM CURRENT USE OF INSULIN: ICD-10-CM

## 2025-01-27 DIAGNOSIS — Z79.4 TYPE 2 DIABETES MELLITUS WITH OTHER SPECIFIED COMPLICATION, WITH LONG-TERM CURRENT USE OF INSULIN: ICD-10-CM

## 2025-01-27 NOTE — PROGRESS NOTES
Primary Care Clinical Pharmacist Follow-up Visit      Date of Follow-up Visit: 1/27/25  Disease state(s) to be managed by clinical pharmacist: DMT2  Referring Provider: ADILSON Pickett (PCP)  Most recent appointment with PCP: 1/15/25  Next appointment with PCP: 2/13/25            **Spoke to patient's ex-sister-in-law, Jessica Hendrix, for visit (patient has previously given permission for ex-sister-in-law to be present at visit with clinical pharmacist). Ex-sister-in-law gives permission for visit to be conducted via telehealth**    Subjective:    Patient is a 75 YO M who was referred to clinical pharmacist for management of disease state(s) by PCP. At last visit with clinical pharmacist on 1/6/25, patient was continued on Trulicity 4.5mg subcutaneous weekly, metformin 1000mg PO BID, and Farxiga 10mg PO daily. At today's visit, patient's ex-sister-in-law reports that patient is currently taking Trulicity 4.5mg subcutaneous weekly, metformin 1000mg PO BID, and Farxiga 10mg PO daily. Ex-sister-in-law reports that patient has been compliant with meds and is tolerating meds at these doses.     Ex-sister-in-law states that there has been no significant change in patient's diet and physical activity since last visit with clinical pharmacist on 1/6/25.    Objective:    Most recent hemoglobin A1C level: 7.5% (11/11/24) (per PCP, goal: less than 8%)    Patient checks glucose level at home 1 time per day.  Below are patient's home glucose readings (all readings morning fasting) since 1/13/25:    149, 143, 144, 133, 146, 132, 139, 161, 146, 145, 149, 157, 155   (Avg =146 mg/dL, goal:  mg/dL)    Most recent eGFR: greater than 90 (8/19/24)  Most recent vitamin B12 level: 245 (8/19/24)  Most recent Albumin/SCr ratio: 8.3 (8/19/24)  Most recent AST/ALT: 16/21 (8/19/24)  Most recent diabetic retinopathy exam: last appointment was July, 2024(diabetic eye exam was performed at most recent visit  and was negative for diabetic retinopathy in both eyes)  Most recent influenza vaccine: 9/23/24  Most recent pneumococcal vaccine: 10/22/17 (PCV13)    Below are the result's of patient's most recent fasting lipid panel: (8/19/24)    LDL: 30 (goal: less than 55)  TG : 207 (goal: less than 500)         HDL:  27.9 (goal: greater than 40)    TC: 99 (goal: less than 200)  The above lab results are reflective of current therapy with atorvastatin 40mg PO daily    Assessment:  Patient's DMT2 is controlled based on latest hemoglobin A1C level of 7.5% (per PCP, goal: less than 8%) and average of patient's home glucose readings. Patient is currently taking dulaglutide 4.5mg subcutaneous weekly, metformin 1000mg PO BID, and Farxiga 10mg PO daily. Per ex-sister-in-law, patient is compliant with meds and is tolerating meds at current doses.      Plan:    - Continue Trulicity 4.5mg subcutaneous weekly  - Continue metformin 1000mg PO BID  - Continue Farxiga 10mg PO daily  - Continue checking glucose  level at home daily and have readings available at next visit with clinical pharmacist    Patient's ex-sister-in-law verbalized understanding of everything discussed at today's visit and agrees with plan formulated by clinical pharmacist    Next visit with clinical pharmacist:  2/24/25 at 10 AM via telehealth      Continue all meds under the continuation of care with the referring provider and clinical pharmacy team.    Patient Education    The patient's ex-sister-in-law is aware of the following regarding DMT2    - The side effects of the medications patient uses to treat disease state(s)  - The signs and symptoms of hypoglycemia and what to do if it occurs       Jose Luis Hills, PharmD, CPh, BCACP  Clinical Pharmacy Specialist  [facility]

## 2025-01-27 NOTE — ASSESSMENT & PLAN NOTE
Assessment:  Patient's DMT2 is controlled based on latest hemoglobin A1C level of 7.5% (per PCP, goal: less than 8%) and average of patient's home glucose readings. Patient is currently taking dulaglutide 4.5mg subcutaneous weekly, metformin 1000mg PO BID, and Farxiga 10mg PO daily. Per ex-sister-in-law, patient is compliant with meds and is tolerating meds at current doses.      Plan:    - Continue Trulicity 4.5mg subcutaneous weekly  - Continue metformin 1000mg PO BID  - Continue Farxiga 10mg PO daily  - Continue checking glucose  level at home daily and have readings available at next visit with clinical pharmacist

## 2025-02-13 ENCOUNTER — APPOINTMENT (OUTPATIENT)
Dept: PRIMARY CARE | Facility: CLINIC | Age: 75
End: 2025-02-13
Payer: COMMERCIAL

## 2025-02-13 LAB
EST. AVERAGE GLUCOSE BLD GHB EST-MCNC: 157 MG/DL
EST. AVERAGE GLUCOSE BLD GHB EST-SCNC: 8.7 MMOL/L
HBA1C MFR BLD: 7.1 % OF TOTAL HGB

## 2025-02-14 PROCEDURE — RXMED WILLOW AMBULATORY MEDICATION CHARGE

## 2025-02-18 ENCOUNTER — PHARMACY VISIT (OUTPATIENT)
Dept: PHARMACY | Facility: CLINIC | Age: 75
End: 2025-02-18
Payer: COMMERCIAL

## 2025-02-24 ENCOUNTER — APPOINTMENT (OUTPATIENT)
Dept: PHARMACY | Facility: HOSPITAL | Age: 75
End: 2025-02-24
Payer: COMMERCIAL

## 2025-02-24 DIAGNOSIS — Z79.4 TYPE 2 DIABETES MELLITUS WITH OTHER SPECIFIED COMPLICATION, WITH LONG-TERM CURRENT USE OF INSULIN: ICD-10-CM

## 2025-02-24 DIAGNOSIS — E11.69 TYPE 2 DIABETES MELLITUS WITH OTHER SPECIFIED COMPLICATION, WITH LONG-TERM CURRENT USE OF INSULIN: ICD-10-CM

## 2025-02-24 NOTE — PROGRESS NOTES
Primary Care Clinical Pharmacist Follow-up Visit      Date of Follow-up Visit: 2/24/25  Disease state(s) to be managed by clinical pharmacist: DMT2  Referring Provider: ADILSON Pickett (PCP)  Most recent appointment with PCP: 1/15/25  Next appointment with PCP: not yet scheduled            **Spoke to patient's ex-sister-in-law, Jessica Hendrix, for visit (patient has previously given permission for ex-sister-in-law to be present at visit with clinical pharmacist). Ex-sister-in-law gives permission for visit to be conducted via telehealth**    Subjective:    Patient is a 73 YO M who was referred to clinical pharmacist for management of disease state(s) by PCP. At last visit with clinical pharmacist on 1/27/25, patient was continued on Trulicity 4.5mg subcutaneous weekly, metformin 1000mg PO BID, and Farxiga 10mg PO daily. At today's visit, patient's ex-sister-in-law reports that patient is currently taking Trulicity 4.5mg subcutaneous weekly, metformin 1000mg PO BID, and Farxiga 10mg PO daily. Ex-sister-in-law reports that patient has been compliant with meds and is tolerating meds at these doses.     Ex-sister-in-law states that there has been no significant change in patient's diet and physical activity since last visit with clinical pharmacist on 1/27/25.    Objective:    Most recent hemoglobin A1C level: 7.1% (2/12/25) (per PCP, goal: less than 8%)    Patient checks glucose level at home 1 time per day.  Below are patient's home glucose readings (all readings morning fasting) since 2/10/25:    145, 126, 172, 152, 143, 147, 150, 148, 149, 159, 164, 162, 158, 160, 152   (Avg =152 mg/dL, goal:  mg/dL)    Most recent eGFR: greater than 90 (8/19/24)  Most recent vitamin B12 level: 245 (8/19/24)  Most recent Albumin/SCr ratio: 8.3 (8/19/24)  Most recent AST/ALT: 16/21 (8/19/24)  Most recent diabetic retinopathy exam: last appointment was July, 2024(diabetic eye exam was performed  at most recent visit and was negative for diabetic retinopathy in both eyes)  Most recent influenza vaccine: 9/23/24  Most recent pneumococcal vaccine: 10/22/17 (PCV13)    Below are the result's of patient's most recent fasting lipid panel: (8/19/24)    LDL: 30 (goal: less than 55)  TG : 207 (goal: less than 500)         HDL:  27.9 (goal: greater than 40)    TC: 99 (goal: less than 200)  The above lab results are reflective of current therapy with atorvastatin 40mg PO daily    Assessment:  Patient's DMT2 is controlled based on latest hemoglobin A1C level of 7.1% (per PCP, goal: less than 8%) and average of patient's home glucose readings. Patient is currently taking dulaglutide 4.5mg subcutaneous weekly, metformin 1000mg PO BID, and Farxiga 10mg PO daily. Per ex-sister-in-law, patient is compliant with meds and is tolerating meds at current doses. Of note, as patient's DMT2 has been controlled for some time now, ex-sister-in-law declines further follow-up with clinical pharmacy for management of DMT2 at this time and wishes to have patient's PCP resume management of patient's DMT2.     Plan:    - Continue Trulicity 4.5mg subcutaneous weekly  - Continue metformin 1000mg PO BID  - Continue Farxiga 10mg PO daily  - Continue checking glucose  level at home daily     Patient's ex-sister-in-law verbalized understanding of everything discussed at today's visit and agrees with plan formulated by clinical pharmacist    Next visit with clinical pharmacist:  N/A-follow-up with clinical pharmacy not necessary at this time. Will defer management of patient's DMT2 back to PCP at this time.      Continue all meds under the continuation of care with the referring provider and clinical pharmacy team.    Patient Education    The patient's ex-sister-in-law is aware of the following regarding DMT2    - The side effects of the medications patient uses to treat disease state(s)  - The signs and symptoms of hypoglycemia and what to do if  it occurs       Jose Luis Hills, PharmD, CPh, BCACP  Clinical Pharmacy Specialist  [facility]

## 2025-02-24 NOTE — ASSESSMENT & PLAN NOTE
Assessment:  Patient's DMT2 is controlled based on latest hemoglobin A1C level of 7.1% (per PCP, goal: less than 8%) and average of patient's home glucose readings. Patient is currently taking dulaglutide 4.5mg subcutaneous weekly, metformin 1000mg PO BID, and Farxiga 10mg PO daily. Per ex-sister-in-law, patient is compliant with meds and is tolerating meds at current doses. Of note, as patient's DMT2 has been controlled for some time now, ex-sister-in-law declines further follow-up with clinical pharmacy for management of DMT2 at this time and wishes to have patient's PCP resume management of patient's DMT2.     Plan:    - Continue Trulicity 4.5mg subcutaneous weekly  - Continue metformin 1000mg PO BID  - Continue Farxiga 10mg PO daily  - Continue checking glucose  level at home daily

## 2025-02-27 DIAGNOSIS — I10 ESSENTIAL (PRIMARY) HYPERTENSION: ICD-10-CM

## 2025-02-27 DIAGNOSIS — I10 BENIGN ESSENTIAL HYPERTENSION: ICD-10-CM

## 2025-02-27 RX ORDER — BENAZEPRIL HYDROCHLORIDE 20 MG/1
20 TABLET ORAL DAILY
Qty: 90 TABLET | Refills: 3 | Status: SHIPPED | OUTPATIENT
Start: 2025-02-27

## 2025-02-27 RX ORDER — CLOPIDOGREL BISULFATE 75 MG/1
75 TABLET ORAL DAILY
Qty: 90 TABLET | Refills: 0 | Status: SHIPPED | OUTPATIENT
Start: 2025-02-27

## 2025-03-06 PROCEDURE — RXMED WILLOW AMBULATORY MEDICATION CHARGE

## 2025-03-08 ENCOUNTER — PHARMACY VISIT (OUTPATIENT)
Dept: PHARMACY | Facility: CLINIC | Age: 75
End: 2025-03-08
Payer: COMMERCIAL

## 2025-03-11 PROCEDURE — RXMED WILLOW AMBULATORY MEDICATION CHARGE

## 2025-03-13 ENCOUNTER — PHARMACY VISIT (OUTPATIENT)
Dept: PHARMACY | Facility: CLINIC | Age: 75
End: 2025-03-13
Payer: COMMERCIAL

## 2025-03-19 DIAGNOSIS — E11.69 HYPERLIPIDEMIA ASSOCIATED WITH TYPE 2 DIABETES MELLITUS (MULTI): ICD-10-CM

## 2025-03-19 DIAGNOSIS — E78.5 HYPERLIPIDEMIA ASSOCIATED WITH TYPE 2 DIABETES MELLITUS (MULTI): ICD-10-CM

## 2025-03-19 DIAGNOSIS — I10 ESSENTIAL (PRIMARY) HYPERTENSION: ICD-10-CM

## 2025-03-20 RX ORDER — ATORVASTATIN CALCIUM 40 MG/1
TABLET, FILM COATED ORAL
Qty: 90 TABLET | Refills: 2 | Status: SHIPPED | OUTPATIENT
Start: 2025-03-20

## 2025-04-08 PROCEDURE — RXMED WILLOW AMBULATORY MEDICATION CHARGE

## 2025-04-10 ENCOUNTER — PHARMACY VISIT (OUTPATIENT)
Dept: PHARMACY | Facility: CLINIC | Age: 75
End: 2025-04-10
Payer: COMMERCIAL

## 2025-04-29 DIAGNOSIS — E11.9 TYPE 2 DIABETES MELLITUS WITHOUT COMPLICATION, WITHOUT LONG-TERM CURRENT USE OF INSULIN: Primary | ICD-10-CM

## 2025-05-19 ENCOUNTER — APPOINTMENT (OUTPATIENT)
Dept: PHARMACY | Facility: HOSPITAL | Age: 75
End: 2025-05-19
Payer: COMMERCIAL

## 2025-05-19 DIAGNOSIS — E11.9 TYPE 2 DIABETES MELLITUS WITHOUT COMPLICATION, WITHOUT LONG-TERM CURRENT USE OF INSULIN: ICD-10-CM

## 2025-05-19 RX ORDER — DULAGLUTIDE 4.5 MG/.5ML
4.5 INJECTION, SOLUTION SUBCUTANEOUS WEEKLY
Qty: 6 ML | Refills: 3 | Status: SHIPPED | OUTPATIENT
Start: 2025-05-19 | End: 2026-05-19

## 2025-05-19 NOTE — PROGRESS NOTES
Primary Care Clinical Pharmacist Follow-up Visit      Date of Follow-up Visit: 5/19/25  Disease state(s) to be managed by clinical pharmacist: DMT2  Referring Provider: ADILSON Pickett (PCP)  Most recent appointment with PCP: 1/15/25  Next appointment with PCP: 5/21/25            **Spoke to patient's ex-sister-in-law, Jessica Hendrix, for visit (patient has previously given permission for ex-sister-in-law to be present at visit with clinical pharmacist). Ex-sister-in-law gives permission for visit to be conducted via telehealth**    Subjective:    Patient is a 73 YO M who was referred to clinical pharmacist for management of disease state(s) by PCP. At last visit with clinical pharmacist on 2/24/25, patient was continued on Trulicity 4.5mg subcutaneous weekly, metformin 1000mg PO BID, and Farxiga 10mg PO daily. At today's visit, patient's ex-sister-in-law reports that patient is currently taking Trulicity 4.5mg subcutaneous weekly, metformin 1000mg PO BID, and Farxiga 10mg PO daily. Ex-sister-in-law reports that patient has been compliant with meds and is tolerating meds at these doses.     Ex-sister-in-law states that there has been no significant change in patient's diet and physical activity since last visit with clinical pharmacist on 2/24/25.    Objective:    Most recent hemoglobin A1C level: 7.1% (2/12/25) (per PCP, goal: less than 8%)    Patient checks glucose level at home 1 time per day.  Below are patient's home glucose readings (all readings morning fasting) since 5/5/25:    156, 132, 129, 149, 182, 197, 174, 171, 160, 158, 153, 172, 155, 147  (Avg = 160 mg/dL, goal:  mg/dL)    Patient did not obtain any glucose readings less than 70 mg/dL since last visit    Most recent eGFR: greater than 90 (8/19/24)  Most recent vitamin B12 level: 245 (8/19/24)  Most recent Albumin/SCr ratio: 8.3 (8/19/24)  Most recent AST/ALT: 16/21 (8/19/24)  Most recent diabetic retinopathy exam:  last appointment was July, 2024(diabetic eye exam was performed at most recent visit and was negative for diabetic retinopathy in both eyes)  Most recent influenza vaccine: 9/23/24  Most recent pneumococcal vaccine: 10/22/17 (PCV13)    Below are the result's of patient's most recent fasting lipid panel: (8/19/24)    LDL: 30 (goal: less than 55)  TG : 207 (goal: less than 500)         HDL:  27.9 (goal: greater than 40)    TC: 99 (goal: less than 200)  The above lab results are reflective of current therapy with atorvastatin 40mg PO daily    Assessment:  Patient's DMT2 is controlled based on latest hemoglobin A1C level of 7.1% (per PCP, goal: less than 8%) and average of patient's home glucose readings. Patient is currently taking dulaglutide 4.5mg subcutaneous weekly, metformin 1000mg PO BID, and Farxiga 10mg PO daily. Per ex-sister-in-law, patient is compliant with meds and is tolerating meds at current doses. Of note, as patient's DMT2 has been controlled for some time now, ex-sister-in-law declines further follow-up with clinical pharmacy for management of DMT2 at this time and wishes to have patient's PCP resume management of patient's DMT2.     Plan:    - Continue Trulicity 4.5mg subcutaneous weekly  - Continue metformin 1000mg PO BID  - Continue Farxiga 10mg PO daily  - Continue checking glucose  level at home daily     Patient's ex-sister-in-law verbalized understanding of everything discussed at today's visit and agrees with plan formulated by clinical pharmacist    Next visit with clinical pharmacist:  N/A-follow-up with clinical pharmacy not necessary at this time. Will defer management of patient's DMT2 back to PCP at this time.      Continue all meds under the continuation of care with the referring provider and clinical pharmacy team.    Patient Education    The patient's ex-sister-in-law is aware of the following regarding DMT2    - The side effects of the medications patient uses to treat disease  state(s)  - The signs and symptoms of hypoglycemia and what to do if it occurs       Jose Luis Hills, PharmD, BCACP  Clinical Pharmacy Specialist  [facility]

## 2025-05-21 ENCOUNTER — OFFICE VISIT (OUTPATIENT)
Dept: PRIMARY CARE | Facility: CLINIC | Age: 75
End: 2025-05-21
Payer: COMMERCIAL

## 2025-05-21 VITALS
SYSTOLIC BLOOD PRESSURE: 120 MMHG | OXYGEN SATURATION: 97 % | TEMPERATURE: 97.6 F | HEART RATE: 78 BPM | RESPIRATION RATE: 16 BRPM | WEIGHT: 205 LBS | BODY MASS INDEX: 34.16 KG/M2 | DIASTOLIC BLOOD PRESSURE: 64 MMHG | HEIGHT: 65 IN

## 2025-05-21 DIAGNOSIS — R39.15 URINARY URGENCY: ICD-10-CM

## 2025-05-21 DIAGNOSIS — E55.9 VITAMIN D DEFICIENCY: ICD-10-CM

## 2025-05-21 DIAGNOSIS — H65.192 ACUTE OTITIS MEDIA WITH EFFUSION OF LEFT EAR: ICD-10-CM

## 2025-05-21 DIAGNOSIS — I10 BENIGN ESSENTIAL HYPERTENSION: ICD-10-CM

## 2025-05-21 DIAGNOSIS — H91.92 DECREASED HEARING OF LEFT EAR: ICD-10-CM

## 2025-05-21 DIAGNOSIS — Z00.00 ROUTINE GENERAL MEDICAL EXAMINATION AT HEALTH CARE FACILITY: Primary | ICD-10-CM

## 2025-05-21 DIAGNOSIS — E11.65 CONTROLLED TYPE 2 DIABETES MELLITUS WITH HYPERGLYCEMIA, WITHOUT LONG-TERM CURRENT USE OF INSULIN: ICD-10-CM

## 2025-05-21 LAB
POC APPEARANCE, URINE: CLEAR
POC BILIRUBIN, URINE: NEGATIVE
POC BLOOD, URINE: NEGATIVE
POC COLOR, URINE: YELLOW
POC GLUCOSE, URINE: NEGATIVE MG/DL
POC KETONES, URINE: NEGATIVE MG/DL
POC LEUKOCYTES, URINE: NEGATIVE
POC NITRITE,URINE: NEGATIVE
POC PH, URINE: 6 PH
POC PROTEIN, URINE: NEGATIVE MG/DL
POC SPECIFIC GRAVITY, URINE: >=1.03
POC UROBILINOGEN, URINE: 0.2 EU/DL

## 2025-05-21 PROCEDURE — 36415 COLL VENOUS BLD VENIPUNCTURE: CPT | Performed by: NURSE PRACTITIONER

## 2025-05-21 PROCEDURE — 81002 URINALYSIS NONAUTO W/O SCOPE: CPT | Performed by: NURSE PRACTITIONER

## 2025-05-21 PROCEDURE — 99215 OFFICE O/P EST HI 40 MIN: CPT | Performed by: NURSE PRACTITIONER

## 2025-05-21 RX ORDER — AMOXICILLIN AND CLAVULANATE POTASSIUM 875; 125 MG/1; MG/1
875 TABLET, FILM COATED ORAL 2 TIMES DAILY
Qty: 14 TABLET | Refills: 0 | Status: SHIPPED | OUTPATIENT
Start: 2025-05-21 | End: 2025-05-28

## 2025-05-21 ASSESSMENT — ACTIVITIES OF DAILY LIVING (ADL)
DRESSING: INDEPENDENT
DOING_HOUSEWORK: INDEPENDENT
MANAGING_FINANCES: NEEDS ASSISTANCE
GROCERY_SHOPPING: INDEPENDENT
BATHING: INDEPENDENT
TAKING_MEDICATION: INDEPENDENT

## 2025-05-21 ASSESSMENT — ENCOUNTER SYMPTOMS
DYSURIA: 0
FREQUENCY: 1
DIARRHEA: 0
CONSTIPATION: 0
ABDOMINAL PAIN: 0
SHORTNESS OF BREATH: 0
FEVER: 0
CHILLS: 0
POLYDIPSIA: 0

## 2025-05-21 ASSESSMENT — PAIN SCALES - GENERAL: PAINLEVEL_OUTOF10: 0-NO PAIN

## 2025-05-21 NOTE — PROGRESS NOTES
"Subjective   Patient ID: Shaan Dailey is a 74 y.o. male who presents for Medicare Annual Wellness Visit Subsequent.    Pt is a 73 yo man with PMH of HTN, T2DM presenting today with his daughter for a Medicare Wellness visit. He reports feeling well in his health. He is adherent with his medications and takes BG each morning, readings within past month 120s-180s. Pt complains of infrequent heartburn relieved with Tums, decreased hearing, frequent sneezing, and intermittently decreased urinary stream.  He does not exercise but does walk to do his laundry and socialize at local restaurants. He denies fever, chills, chest pain, SOB, vision changes, dysuria, or substance use.        Review of Systems   Constitutional:  Negative for chills and fever.   HENT:  Positive for hearing loss.    Respiratory:  Negative for shortness of breath.    Cardiovascular:  Negative for chest pain.   Gastrointestinal:  Negative for abdominal pain, constipation and diarrhea.   Endocrine: Negative for polydipsia.   Genitourinary:  Positive for frequency. Negative for dysuria.       Objective   /64   Pulse 78   Temp 36.4 °C (97.6 °F)   Resp 16   Ht 1.651 m (5' 5\")   Wt 93 kg (205 lb)   SpO2 97%   BMI 34.11 kg/m²     Physical Exam  Constitutional:       Appearance: He is not ill-appearing.   HENT:      Right Ear: Tympanic membrane normal.      Left Ear: Decreased hearing noted. A middle ear effusion is present.      Mouth/Throat:      Mouth: Mucous membranes are dry.      Pharynx: Posterior oropharyngeal erythema and postnasal drip present.   Cardiovascular:      Rate and Rhythm: Normal rate and regular rhythm.      Pulses: Normal pulses.      Heart sounds: Normal heart sounds.   Pulmonary:      Effort: Pulmonary effort is normal.      Breath sounds: Normal breath sounds.   Abdominal:      General: Abdomen is flat.      Palpations: Abdomen is soft.      Tenderness: There is no abdominal tenderness. There is no guarding.   Skin:     " General: Skin is warm and dry.   Neurological:      Mental Status: He is alert and oriented to person, place, and time.   Psychiatric:         Mood and Affect: Mood normal.         Behavior: Behavior normal.         Assessment/Plan   Diagnoses and all orders for this visit:  Routine general medical examination at health care facility  Urinary urgency  -     PSA, total and free  -     POCT UA (nonautomated) manually resulted  -     Basic Metabolic Panel  Benign essential hypertension  -     Vitamin D 25-Hydroxy,Total (for eval of Vitamin D levels)  -     Vitamin B12  -     Basic Metabolic Panel  Controlled type 2 diabetes mellitus with hyperglycemia, without long-term current use of insulin  -     Hemoglobin A1C  -     Lipid Panel  -     Vitamin D 25-Hydroxy,Total (for eval of Vitamin D levels)  -     Vitamin B12  -     Basic Metabolic Panel  Acute otitis media with effusion of left ear  Decreased hearing of left ear  -     Referral to Audiology; Future  -     amoxicillin-clavulanate (Augmentin) 875-125 mg tablet; Take 1 tablet (875 mg) by mouth 2 times a day for 7 days.  Vitamin D deficiency  -     Vitamin D 25-Hydroxy,Total (for eval of Vitamin D levels)  Other orders  -     3 Month Follow Up In Primary Care; Future    Follow up in 6 months or sooner if needed.    JOSE DE JESUS Mcdaniel

## 2025-05-21 NOTE — PROGRESS NOTES
"Subjective   Reason for Visit: Shaan Dailey is an 74 y.o. male here for a Medicare Wellness visit.               HPI    Patient Care Team:  ADILSON Pickett as PCP - General (Family Medicine)  ADILSON Pickett as PCP - [employer] PCP  Jose Luis Hills, PharmD as Pharmacist (Pharmacy)     Review of Systems    Objective   Vitals:  /64   Pulse 78   Temp 36.4 °C (97.6 °F)   Resp 16   Ht 1.651 m (5' 5\")   Wt 93 kg (205 lb)   SpO2 97%   BMI 34.11 kg/m²       Physical Exam    Assessment & Plan  Routine general medical examination at health care facility              {AWV Counseling (Optional):65028}     "

## 2025-05-21 NOTE — PATIENT INSTRUCTIONS
Thank you for coming in for your visit today!    Please follow up in 6 months or sooner if needed.    Today we completed blood work. We will contact you with any abnormalities from this testing.    Drink lots of water while taking the antibiotic.  Eat yogurt with active cultures or take a probiotic.   Take the medication with food.  Take ALL of this medication even if you are feeling better.     Increase exercise!!     For your blood pressure:  Take your medication as directed. Try to take it around the same time daily.   Keep a log of your blood pressure. Be sure to bring it with you to your next appointment so we can review it together.  Adhere to the DASH diet. This includes decreasing your salt/sodium intake. Avoid canned foods, lunch meats, and frozen foods.  Exercise for 30 minutes daily.      Call 911 or go to the emergency room if you have pain in your chest, difficulty breathing, or other life threatening symptoms.

## 2025-05-22 LAB
25(OH)D3+25(OH)D2 SERPL-MCNC: 26 NG/ML (ref 30–100)
ANION GAP SERPL CALCULATED.4IONS-SCNC: 9 MMOL/L (CALC) (ref 7–17)
BUN SERPL-MCNC: 22 MG/DL (ref 7–25)
BUN/CREAT SERPL: ABNORMAL (CALC) (ref 6–22)
CALCIUM SERPL-MCNC: 10.9 MG/DL (ref 8.6–10.3)
CHLORIDE SERPL-SCNC: 103 MMOL/L (ref 98–110)
CHOLEST SERPL-MCNC: 130 MG/DL
CHOLEST/HDLC SERPL: 4.2 (CALC)
CO2 SERPL-SCNC: 25 MMOL/L (ref 20–32)
CREAT SERPL-MCNC: 0.85 MG/DL (ref 0.7–1.28)
EGFRCR SERPLBLD CKD-EPI 2021: 91 ML/MIN/1.73M2
EST. AVERAGE GLUCOSE BLD GHB EST-MCNC: 206 MG/DL
EST. AVERAGE GLUCOSE BLD GHB EST-SCNC: 11.4 MMOL/L
GLUCOSE SERPL-MCNC: 136 MG/DL (ref 65–99)
HBA1C MFR BLD: 8.8 %
HDLC SERPL-MCNC: 31 MG/DL
LDLC SERPL CALC-MCNC: 78 MG/DL (CALC)
NONHDLC SERPL-MCNC: 99 MG/DL (CALC)
POTASSIUM SERPL-SCNC: 4.9 MMOL/L (ref 3.5–5.3)
PSA FREE MFR SERPL: 23 % (CALC)
PSA FREE SERPL-MCNC: 0.6 NG/ML
PSA SERPL-MCNC: 2.6 NG/ML
SODIUM SERPL-SCNC: 137 MMOL/L (ref 135–146)
TRIGL SERPL-MCNC: 128 MG/DL
VIT B12 SERPL-MCNC: 302 PG/ML (ref 200–1100)

## 2025-05-27 DIAGNOSIS — I10 ESSENTIAL (PRIMARY) HYPERTENSION: ICD-10-CM

## 2025-05-27 RX ORDER — CLOPIDOGREL BISULFATE 75 MG/1
75 TABLET ORAL DAILY
Qty: 90 TABLET | Refills: 1 | Status: SHIPPED | OUTPATIENT
Start: 2025-05-27

## 2025-05-27 NOTE — ASSESSMENT & PLAN NOTE
Orders:    Hemoglobin A1C    Lipid Panel    Vitamin D 25-Hydroxy,Total (for eval of Vitamin D levels)    Vitamin B12    Basic Metabolic Panel

## 2025-05-27 NOTE — ASSESSMENT & PLAN NOTE
Orders:    Referral to Audiology; Future    amoxicillin-clavulanate (Augmentin) 875-125 mg tablet; Take 1 tablet (875 mg) by mouth 2 times a day for 7 days.

## 2025-05-27 NOTE — ASSESSMENT & PLAN NOTE
Orders:    Vitamin D 25-Hydroxy,Total (for eval of Vitamin D levels)    Vitamin B12    Basic Metabolic Panel

## 2025-06-07 PROCEDURE — RXMED WILLOW AMBULATORY MEDICATION CHARGE

## 2025-06-10 ENCOUNTER — PHARMACY VISIT (OUTPATIENT)
Dept: PHARMACY | Facility: CLINIC | Age: 75
End: 2025-06-10
Payer: COMMERCIAL

## 2025-06-12 ENCOUNTER — APPOINTMENT (OUTPATIENT)
Dept: PRIMARY CARE | Facility: CLINIC | Age: 75
End: 2025-06-12
Payer: COMMERCIAL

## 2025-06-15 PROCEDURE — RXMED WILLOW AMBULATORY MEDICATION CHARGE

## 2025-06-18 ENCOUNTER — PHARMACY VISIT (OUTPATIENT)
Dept: PHARMACY | Facility: CLINIC | Age: 75
End: 2025-06-18
Payer: COMMERCIAL

## 2025-06-24 ENCOUNTER — CLINICAL SUPPORT (OUTPATIENT)
Dept: AUDIOLOGY | Facility: HOSPITAL | Age: 75
End: 2025-06-24
Payer: COMMERCIAL

## 2025-06-24 DIAGNOSIS — H90.3 ASYMMETRIC SNHL (SENSORINEURAL HEARING LOSS): Primary | ICD-10-CM

## 2025-06-24 DIAGNOSIS — H69.92 DYSFUNCTION OF LEFT EUSTACHIAN TUBE: ICD-10-CM

## 2025-06-24 PROCEDURE — 92567 TYMPANOMETRY: CPT

## 2025-06-24 PROCEDURE — 92557 COMPREHENSIVE HEARING TEST: CPT

## 2025-06-24 NOTE — Clinical Note
Hello! This patient needs an NPV with adult ENT due to asymmetric SNHL and ETD of the left ear. First available, any provider is fine. Thank you!

## 2025-06-24 NOTE — PROGRESS NOTES
ADULT AUDIOLOGY EVALUATION      Name:  Shaan Dailey   :  1950  Age:  74 y.o.  Date of Evaluation:  2025    Time: 10:00-10:30    IMPRESSIONS     Right ear: normal middle ear functioning, moderate rising to mild sloping to severe sensorineural hearing loss, and excellent (96%) word understanding at 80 dB HL.   Left ear: abnormal middle ear functioning (consistent with negative pressure in middle ear space) , moderately-severe rising to mild sloping to severe sensorineural hearing loss, and excellent (96%) word understanding at 90 dB HL.     Due to chronic abnormal middle ear functioning and asymmetric hearing loss, I will refer Shaan to ENT for further medical evaluation and treatment. We discussed that he is a hearing aid candidate and would likely benefit from amplification due to his amount and type of hearing loss; he may pursue amplification once medically cleared by ENT.     RECOMMENDATIONS     Continue medical follow up with primary care provider and/or Ears Nose and Throat (ENT) provider as recommended.  Referral for ENT placed today.  Re-test hearing in conjunction with otologic care, or at least annually to monitor.  Pursue amplification pending medical clearance and patient motivation.  Appropriate communication techniques (face-to-face at a 4-6 foot maximum distance, reduced background noise, speech at normal volume and slower rate, good lighting, etc).    HISTORY     Mr. Dailey, 74 y.o., was seen today for an initial audiologic evaluation at the request of ADILSON Pickett. He was accompanied to today's appointment by his sister in law. Shaan reported that he has been struggling to hear, particularly in his left ear, for many years. He noted seeing an Ear Nose and Throat physician many years ago but does not recall any otologic surgeries or recommendations. He noted that he had an ear infection/fluid a couple of weeks ago that was treated by antibiotics per his PCP. He noted that his ears  feel better, but he still cannot hear very well.     Shaan denied tinnitus, dizziness, aural fullness, otalgia, otorrhea, history of otologic surgeries or history of loud noise exposure.     AUDIOGRAM         TEST RESULTS     Otoscopic Evaluation:  Right Ear: Ear canal clear, tympanic membrane visualized.  Left Ear: Ear canal clear, tympanic membrane visualized.    Tympanometry (226 Hz): This test is an objective evaluation of middle ear function. CPT code: 61352   Right Ear: Type A, middle ear pressure and tympanic membrane compliance within normal limits.   Left Ear: Type C, negative middle ear pressure (-300 daPa) and restricted eardrum mobility.     Acoustic Reflexes: This test is an objective measure of auditory and facial nerve pathways.   (Probe) Right Ear (ipsi right stimulus ear; contralateral left stimulus ear): Absent 500-4000 Hz; upward deflection noted.   (Probe) Left Ear (ipsi left stimulus ear; contralateral right stimulus ear): Could not test due to abnormal middle ear status.     Distortion Product Otoacoustic Emissions (DPOAE): This test is a measurement of responses which are generated by the cochlea when it is simultaneously stimulated by two pure tone frequencies. CPT code: 82516   Right Ear: Did not test.  Left Ear: Did not test.    Test technique: Conventional Audiometry via headphones. This test is an evaluation hearing sensitivity via air and bone conduction and speech recognition testing. CPT code: 42741  Reliability: good    Pure Tone Audiometry (125-8000 Hz):     Right Ear: Moderate rising to mild sloping to severe sensorineural hearing loss.   Left Ear: Moderately-severe rising to mild sloping to severe sensorineural hearing loss.   NOTE - asymmetries exist 125-1500 Hz, left ear being worse.     Speech Audiometry:   Right Ear: Speech Reception Threshold (SRT) was obtained at 35 dB HL. This is in good agreement with three frequency Pure Tone Average.   Word Recognition scores were  excellent (96%) in quiet when words were presented at 80 dB HL. These results are based on [facility] Auditory Test No.6 (NU-6) Ordered by difficulty (N=10).  Left Ear: Speech Reception Threshold (SRT) was obtained at 50 dB HL. This is in good agreement with three frequency Pure Tone Average.   Word Recognition scores were excellent (96%) in quiet when words were presented at 90 dB HL. These results are based on [facility] Auditory Test No.6 (NU-6) Ordered by difficulty (N=10).     Comparison of today's results with previous test results: No previous results available.          Juan Manuel Miles, CCC-A, [facility]  Senior Clinical Audiologist -  Audiology Services       Degree of   Hearing Sensitivity dB Range   Within Normal Limits (WNL) 0 - 20   Slight 25   Mild 26 - 40   Moderate 41 - 55   Moderately-Severe 56 - 70   Severe 71 - 90   Profound 91 +     Key   CHL Conductive Hearing Loss   ECV Ear Canal Volume   HA Hearing Aid   NIHL Noise-Induced Hearing Loss   PTA Pure Tone Average   SNHL Sensorineural Hearing Loss   TM Tympanic Membrane   WNL Within Normal Limits

## 2025-06-26 ENCOUNTER — OFFICE VISIT (OUTPATIENT)
Dept: PRIMARY CARE | Facility: CLINIC | Age: 75
End: 2025-06-26
Payer: COMMERCIAL

## 2025-06-26 VITALS
OXYGEN SATURATION: 96 % | HEART RATE: 84 BPM | SYSTOLIC BLOOD PRESSURE: 117 MMHG | TEMPERATURE: 97 F | WEIGHT: 202 LBS | BODY MASS INDEX: 33.66 KG/M2 | HEIGHT: 65 IN | RESPIRATION RATE: 16 BRPM | DIASTOLIC BLOOD PRESSURE: 75 MMHG

## 2025-06-26 DIAGNOSIS — E83.52 HYPERCALCEMIA: ICD-10-CM

## 2025-06-26 DIAGNOSIS — R73.9 HYPERGLYCEMIA: Primary | ICD-10-CM

## 2025-06-26 PROCEDURE — 3008F BODY MASS INDEX DOCD: CPT | Performed by: NURSE PRACTITIONER

## 2025-06-26 PROCEDURE — 99214 OFFICE O/P EST MOD 30 MIN: CPT | Performed by: NURSE PRACTITIONER

## 2025-06-26 PROCEDURE — 1126F AMNT PAIN NOTED NONE PRSNT: CPT | Performed by: NURSE PRACTITIONER

## 2025-06-26 PROCEDURE — 1036F TOBACCO NON-USER: CPT | Performed by: NURSE PRACTITIONER

## 2025-06-26 PROCEDURE — 3078F DIAST BP <80 MM HG: CPT | Performed by: NURSE PRACTITIONER

## 2025-06-26 PROCEDURE — 1159F MED LIST DOCD IN RCRD: CPT | Performed by: NURSE PRACTITIONER

## 2025-06-26 PROCEDURE — 36415 COLL VENOUS BLD VENIPUNCTURE: CPT | Performed by: NURSE PRACTITIONER

## 2025-06-26 PROCEDURE — 3074F SYST BP LT 130 MM HG: CPT | Performed by: NURSE PRACTITIONER

## 2025-06-26 PROCEDURE — 4010F ACE/ARB THERAPY RXD/TAKEN: CPT | Performed by: NURSE PRACTITIONER

## 2025-06-26 ASSESSMENT — PAIN SCALES - GENERAL: PAINLEVEL_OUTOF10: 0-NO PAIN

## 2025-06-26 ASSESSMENT — PATIENT HEALTH QUESTIONNAIRE - PHQ9
SUM OF ALL RESPONSES TO PHQ9 QUESTIONS 1 AND 2: 0
2. FEELING DOWN, DEPRESSED OR HOPELESS: NOT AT ALL
1. LITTLE INTEREST OR PLEASURE IN DOING THINGS: NOT AT ALL

## 2025-06-26 ASSESSMENT — ENCOUNTER SYMPTOMS
DEPRESSION: 0
OCCASIONAL FEELINGS OF UNSTEADINESS: 0
LOSS OF SENSATION IN FEET: 0

## 2025-06-26 NOTE — PROGRESS NOTES
"Subjective   Shaan Dailey is a 74 y.o. male who presents for Follow-up.  HPI    2007 stents cad  Plavix aspirin  Lipitor    Also noted to have elevated A1c which varies   Feels good  Is working hard on his DM has been DM for some time     Hypercalcemia noted but not new//no kidney parathyroid or thyroid issues     Elevated psa noted no prior but barely above      All systems reviewed. Review of systems negative except for noted positives in HPI    Objective     /75   Pulse 84   Temp 36.1 °C (97 °F)   Resp 16   Ht 1.651 m (5' 5\")   Wt 91.6 kg (202 lb)   SpO2 96%   BMI 33.61 kg/m²    Vital signs noted and reviewed.       Physical Exam  Vitals and nursing note reviewed.   Constitutional:       Appearance: Normal appearance.   HENT:      Head: Normocephalic and atraumatic.      Right Ear: Tympanic membrane normal.      Left Ear: Tympanic membrane normal.      Nose: Nose normal.      Mouth/Throat:      Mouth: Mucous membranes are dry.   Eyes:      Pupils: Pupils are equal, round, and reactive to light.   Cardiovascular:      Rate and Rhythm: Normal rate and regular rhythm.      Pulses: Normal pulses.      Heart sounds: Normal heart sounds.   Pulmonary:      Effort: Pulmonary effort is normal.      Breath sounds: Normal breath sounds.   Abdominal:      General: Bowel sounds are normal.      Palpations: Abdomen is soft.   Musculoskeletal:         General: Normal range of motion.      Cervical back: Normal range of motion and neck supple.   Skin:     General: Skin is warm and dry.      Capillary Refill: Capillary refill takes less than 2 seconds.   Neurological:      General: No focal deficit present.      Mental Status: He is alert.   Psychiatric:         Mood and Affect: Mood normal.             Assessment/Plan   Problem List Items Addressed This Visit    None  Visit Diagnoses         Hyperglycemia    -  Primary    Relevant Orders    Hemoglobin A1C      Hypercalcemia        Relevant Orders    Referral to " Related to underlying malignancy -    Might need splenectomy.     10/15- oncology follow up .   Endocrinology

## 2025-07-21 ENCOUNTER — APPOINTMENT (OUTPATIENT)
Facility: CLINIC | Age: 75
End: 2025-07-21
Payer: COMMERCIAL

## 2025-07-21 VITALS
SYSTOLIC BLOOD PRESSURE: 146 MMHG | BODY MASS INDEX: 33.66 KG/M2 | WEIGHT: 202 LBS | DIASTOLIC BLOOD PRESSURE: 75 MMHG | HEIGHT: 65 IN

## 2025-07-21 DIAGNOSIS — H69.92 EUSTACHIAN TUBE DYSFUNCTION, LEFT: ICD-10-CM

## 2025-07-21 DIAGNOSIS — H90.3 SENSORINEURAL HEARING LOSS (SNHL) OF BOTH EARS: Primary | ICD-10-CM

## 2025-07-21 DIAGNOSIS — H91.8X3 ASYMMETRICAL HEARING LOSS: ICD-10-CM

## 2025-07-21 PROCEDURE — 99204 OFFICE O/P NEW MOD 45 MIN: CPT | Performed by: STUDENT IN AN ORGANIZED HEALTH CARE EDUCATION/TRAINING PROGRAM

## 2025-07-21 PROCEDURE — 3008F BODY MASS INDEX DOCD: CPT | Performed by: STUDENT IN AN ORGANIZED HEALTH CARE EDUCATION/TRAINING PROGRAM

## 2025-07-21 PROCEDURE — 4010F ACE/ARB THERAPY RXD/TAKEN: CPT | Performed by: STUDENT IN AN ORGANIZED HEALTH CARE EDUCATION/TRAINING PROGRAM

## 2025-07-21 PROCEDURE — 1159F MED LIST DOCD IN RCRD: CPT | Performed by: STUDENT IN AN ORGANIZED HEALTH CARE EDUCATION/TRAINING PROGRAM

## 2025-07-21 PROCEDURE — 1160F RVW MEDS BY RX/DR IN RCRD: CPT | Performed by: STUDENT IN AN ORGANIZED HEALTH CARE EDUCATION/TRAINING PROGRAM

## 2025-07-21 PROCEDURE — 3078F DIAST BP <80 MM HG: CPT | Performed by: STUDENT IN AN ORGANIZED HEALTH CARE EDUCATION/TRAINING PROGRAM

## 2025-07-21 PROCEDURE — 3077F SYST BP >= 140 MM HG: CPT | Performed by: STUDENT IN AN ORGANIZED HEALTH CARE EDUCATION/TRAINING PROGRAM

## 2025-07-21 PROCEDURE — 92504 EAR MICROSCOPY EXAMINATION: CPT | Performed by: STUDENT IN AN ORGANIZED HEALTH CARE EDUCATION/TRAINING PROGRAM

## 2025-07-21 NOTE — PROGRESS NOTES
ENT Outpatient Consultation    Chief Complaint: Left-sided eustachian tube dysfunction  History Of Present Illness  Shaan Dailey is a 74 y.o. male presents for abnormal tympanometry in the left ear.  He is accompanied by his sister-in-law who assists with history.  He is working on getting hearing aids and was sent to ENT for medical clearance.  He had an audiogram on 6/24/2025 which showed negative pressure in the left ear.  He reports that he had a left ear infection a few weeks prior to this test and was treated with antibiotics with improvement in symptoms.  At this time, he denies any ear popping, otalgia, drainage, or fullness.  He is not bothered by any ear symptoms at this time.  No prior ear surgeries.  No vertigo or ringing noted.       Past Medical History  He has a past medical history of Tinea pedis (08/21/2020).    Surgical History  He has a past surgical history that includes Other surgical history (02/05/2019) and Eye surgery.     Social History  He reports that he has quit smoking. His smoking use included cigarettes. He has a 40 pack-year smoking history. He has never used smokeless tobacco. He reports that he does not drink alcohol and does not use drugs.    Family History  Family History[1]     Allergies  Patient has no known allergies.     Physical Exam:  CONSTITUTIONAL:  No acute distress  VOICE:  No hoarseness or other abnormality  RESPIRATION:  Breathing comfortably, no stridor  EYES:  EOM intact, sclera normal  NEURO:  Alert and oriented times 3  HEAD AND FACE:  Symmetric facial features, no masses or lesions  EARS:  Normal external ears, see otomicroscopy  NOSE:  External nose midline, anterior rhinoscopy is normal with limited visualization to the anterior aspect of the interior turbinates, no bleeding or drainage, no lesions  ORAL CAVITY/OROPHARYNX/LIPS:  Normal mucous membranes, normal floor of mouth/tongue/OP, no masses or lesions  PHARYNGEAL WALLS:  No masses or lesions  NECK/LYMPH:  No  "LAD, no thyroid masses, trachea midline  SKIN:  Neck skin is without scar or injury  PSYCH:  Alert and oriented with appropriate mood and affect    Procedure: Otomicroscopy  Right:  EAC patent and normal, TM intact without effusion, pars flaccida retraction pocket (smaller than the left), middle ear space clear   Left: Small amount of cerumen removed with curette.  Once removed, EAC patent and normal, TM intact without effusion, pars flaccida retraction pocket, hypomobile to pneumatic insufflation    512 Hz tuning fork  Crews to the right  Air conduction greater than bone conduction bilaterally       Last Recorded Vitals  Blood pressure 146/75, height 1.651 m (5' 5\"), weight 91.6 kg (202 lb).    Relevant Results  Audiogram 6/24/2025:    My independent interpretation:  Right ear: Moderate sloping to mild sloping to severe sensorineural hearing loss with excellent word recognition and normal middle ear function.  Left ear: Moderately severe rising to mild then to severe sensorineural hearing loss with excellent word recognition score, significant negative pressure but no conductive component    Left ear is asymmetrically worse in the right up to 3000 Hz    Imaging  No results found.    Cardiology, Vascular, and Other Imaging  No other imaging results found for the past 7 days      Assessment and Plan  74 y.o. male with abnormal tympanometry in the left ear referred from audiology for clearance prior to obtaining hearing aids.  His audiogram was about 1 month prior to my visit today.  It is unclear to me if his tympanometry would be the same today as he had been recently treated for a left-sided ear infection and effusion may have been resolving.  He has no symptoms attributable to the left-sided eustachian tube dysfunction although the left ear drum does appear hypomobile today.  Given the lack of a conductive hearing loss that could be attributable to the negative pressure as well as the lack of symptoms, I do not " recommend placement of a PE tube or other intervention today.    He does have left-sided sensorineural hearing loss that is is asymmetrically worse than the right.  We discussed obtaining an MRI to rule out a retrocochlear pathology however per patient preference decided to hold off at this time.  He has no vertigo or tinnitus.  Will plan for a repeat hearing test in 1 year followed by a visit for recheck of his retraction pockets bilaterally to ensure no debris collects.  If left-sided hearing is worsening asymmetrically compared to the right, we will recommend an MRI at that time.  In the meantime, I recommend following through with a hearing aid evaluation with audiology.    Problem List Items Addressed This Visit       Bilateral hearing loss - Primary    Relevant Orders    Hearing aid evaluation     Other Visit Diagnoses         Asymmetrical hearing loss          Eustachian tube dysfunction, left                Giulia Bahena MD         [1] No family history on file.

## 2025-07-22 ENCOUNTER — TELEPHONE (OUTPATIENT)
Dept: AUDIOLOGY | Facility: HOSPITAL | Age: 75
End: 2025-07-22
Payer: COMMERCIAL

## 2025-07-28 ENCOUNTER — APPOINTMENT (OUTPATIENT)
Dept: AUDIOLOGY | Facility: HOSPITAL | Age: 75
End: 2025-07-28
Payer: COMMERCIAL

## 2025-08-13 LAB
EST. AVERAGE GLUCOSE BLD GHB EST-MCNC: 166 MG/DL
EST. AVERAGE GLUCOSE BLD GHB EST-SCNC: 9.2 MMOL/L
HBA1C MFR BLD: 7.4 %

## 2025-08-25 DIAGNOSIS — I10 BENIGN ESSENTIAL HYPERTENSION: ICD-10-CM

## 2025-08-25 RX ORDER — AMLODIPINE BESYLATE 10 MG/1
10 TABLET ORAL DAILY
Qty: 90 TABLET | Refills: 3 | Status: SHIPPED | OUTPATIENT
Start: 2025-08-25

## 2025-12-22 ENCOUNTER — APPOINTMENT (OUTPATIENT)
Dept: ENDOCRINOLOGY | Facility: CLINIC | Age: 75
End: 2025-12-22
Payer: COMMERCIAL

## 2026-07-20 ENCOUNTER — APPOINTMENT (OUTPATIENT)
Facility: CLINIC | Age: 76
End: 2026-07-20
Payer: COMMERCIAL